# Patient Record
Sex: FEMALE | Race: WHITE | NOT HISPANIC OR LATINO | Employment: OTHER | URBAN - METROPOLITAN AREA
[De-identification: names, ages, dates, MRNs, and addresses within clinical notes are randomized per-mention and may not be internally consistent; named-entity substitution may affect disease eponyms.]

---

## 2019-05-01 ENCOUNTER — HISTORICAL (OUTPATIENT)
Dept: ADMINISTRATIVE | Facility: HOSPITAL | Age: 79
End: 2019-05-01

## 2019-05-06 ENCOUNTER — HISTORICAL (OUTPATIENT)
Dept: ADMINISTRATIVE | Facility: HOSPITAL | Age: 79
End: 2019-05-06

## 2019-06-17 ENCOUNTER — HISTORICAL (OUTPATIENT)
Dept: ADMINISTRATIVE | Facility: HOSPITAL | Age: 79
End: 2019-06-17

## 2022-04-09 ENCOUNTER — HISTORICAL (OUTPATIENT)
Dept: ADMINISTRATIVE | Facility: HOSPITAL | Age: 82
End: 2022-04-09

## 2022-04-26 VITALS
HEIGHT: 62 IN | SYSTOLIC BLOOD PRESSURE: 132 MMHG | DIASTOLIC BLOOD PRESSURE: 68 MMHG | BODY MASS INDEX: 33.68 KG/M2 | WEIGHT: 183 LBS

## 2022-06-27 ENCOUNTER — OFFICE VISIT (OUTPATIENT)
Dept: ORTHOPEDICS | Facility: CLINIC | Age: 82
End: 2022-06-27
Payer: MEDICARE

## 2022-06-27 ENCOUNTER — HOSPITAL ENCOUNTER (OUTPATIENT)
Dept: RADIOLOGY | Facility: CLINIC | Age: 82
Discharge: HOME | End: 2022-06-27
Attending: SPECIALIST
Payer: MEDICARE

## 2022-06-27 VITALS
WEIGHT: 177 LBS | DIASTOLIC BLOOD PRESSURE: 81 MMHG | HEART RATE: 50 BPM | SYSTOLIC BLOOD PRESSURE: 148 MMHG | HEIGHT: 60 IN | BODY MASS INDEX: 34.75 KG/M2

## 2022-06-27 DIAGNOSIS — M25.561 CHRONIC PAIN OF RIGHT KNEE: Primary | ICD-10-CM

## 2022-06-27 DIAGNOSIS — G89.29 CHRONIC PAIN OF RIGHT KNEE: ICD-10-CM

## 2022-06-27 DIAGNOSIS — M17.11 PRIMARY OSTEOARTHRITIS OF RIGHT KNEE: ICD-10-CM

## 2022-06-27 DIAGNOSIS — M25.561 CHRONIC PAIN OF RIGHT KNEE: ICD-10-CM

## 2022-06-27 DIAGNOSIS — Z96.652 HISTORY OF TOTAL KNEE ARTHROPLASTY, LEFT: ICD-10-CM

## 2022-06-27 DIAGNOSIS — G89.29 CHRONIC PAIN OF RIGHT KNEE: Primary | ICD-10-CM

## 2022-06-27 PROCEDURE — 1125F PR PAIN SEVERITY QUANTIFIED, PAIN PRESENT: ICD-10-PCS | Mod: CPTII,,, | Performed by: SPECIALIST

## 2022-06-27 PROCEDURE — 1159F MED LIST DOCD IN RCRD: CPT | Mod: CPTII,,, | Performed by: SPECIALIST

## 2022-06-27 PROCEDURE — 3288F PR FALLS RISK ASSESSMENT DOCUMENTED: ICD-10-PCS | Mod: CPTII,,, | Performed by: SPECIALIST

## 2022-06-27 PROCEDURE — 1125F AMNT PAIN NOTED PAIN PRSNT: CPT | Mod: CPTII,,, | Performed by: SPECIALIST

## 2022-06-27 PROCEDURE — 1101F PR PT FALLS ASSESS DOC 0-1 FALLS W/OUT INJ PAST YR: ICD-10-PCS | Mod: CPTII,,, | Performed by: SPECIALIST

## 2022-06-27 PROCEDURE — 73564 XR KNEE COMP 4 OR MORE VIEWS RIGHT: ICD-10-PCS | Mod: RT,,, | Performed by: SPECIALIST

## 2022-06-27 PROCEDURE — 3079F DIAST BP 80-89 MM HG: CPT | Mod: CPTII,,, | Performed by: SPECIALIST

## 2022-06-27 PROCEDURE — 1160F RVW MEDS BY RX/DR IN RCRD: CPT | Mod: CPTII,,, | Performed by: SPECIALIST

## 2022-06-27 PROCEDURE — 1159F PR MEDICATION LIST DOCUMENTED IN MEDICAL RECORD: ICD-10-PCS | Mod: CPTII,,, | Performed by: SPECIALIST

## 2022-06-27 PROCEDURE — 3288F FALL RISK ASSESSMENT DOCD: CPT | Mod: CPTII,,, | Performed by: SPECIALIST

## 2022-06-27 PROCEDURE — 3077F PR MOST RECENT SYSTOLIC BLOOD PRESSURE >= 140 MM HG: ICD-10-PCS | Mod: CPTII,,, | Performed by: SPECIALIST

## 2022-06-27 PROCEDURE — 99213 OFFICE O/P EST LOW 20 MIN: CPT | Mod: ,,, | Performed by: SPECIALIST

## 2022-06-27 PROCEDURE — 1160F PR REVIEW ALL MEDS BY PRESCRIBER/CLIN PHARMACIST DOCUMENTED: ICD-10-PCS | Mod: CPTII,,, | Performed by: SPECIALIST

## 2022-06-27 PROCEDURE — 99213 PR OFFICE/OUTPT VISIT, EST, LEVL III, 20-29 MIN: ICD-10-PCS | Mod: ,,, | Performed by: SPECIALIST

## 2022-06-27 PROCEDURE — 73564 X-RAY EXAM KNEE 4 OR MORE: CPT | Mod: RT,,, | Performed by: SPECIALIST

## 2022-06-27 PROCEDURE — 1101F PT FALLS ASSESS-DOCD LE1/YR: CPT | Mod: CPTII,,, | Performed by: SPECIALIST

## 2022-06-27 PROCEDURE — 3077F SYST BP >= 140 MM HG: CPT | Mod: CPTII,,, | Performed by: SPECIALIST

## 2022-06-27 PROCEDURE — 3079F PR MOST RECENT DIASTOLIC BLOOD PRESSURE 80-89 MM HG: ICD-10-PCS | Mod: CPTII,,, | Performed by: SPECIALIST

## 2022-06-27 RX ORDER — KETOCONAZOLE 20 MG/ML
1 SHAMPOO, SUSPENSION TOPICAL EVERY OTHER DAY
COMMUNITY
Start: 2022-05-26

## 2022-06-27 RX ORDER — GLIPIZIDE 5 MG/1
5 TABLET, FILM COATED, EXTENDED RELEASE ORAL EVERY MORNING
COMMUNITY
Start: 2022-06-07

## 2022-06-27 RX ORDER — LOSARTAN POTASSIUM 100 MG/1
100 TABLET ORAL EVERY MORNING
COMMUNITY
Start: 2022-06-07

## 2022-06-27 RX ORDER — METOPROLOL TARTRATE 100 MG/1
TABLET ORAL
COMMUNITY
Start: 2022-06-07

## 2022-06-27 RX ORDER — CITALOPRAM 40 MG/1
40 TABLET, FILM COATED ORAL EVERY MORNING
COMMUNITY
Start: 2022-06-07

## 2022-06-27 RX ORDER — TRAZODONE HYDROCHLORIDE 100 MG/1
200 TABLET ORAL NIGHTLY
COMMUNITY
Start: 2022-06-07

## 2022-06-27 RX ORDER — AMLODIPINE BESYLATE 5 MG/1
TABLET ORAL
COMMUNITY
Start: 2021-10-07

## 2022-06-27 RX ORDER — FUROSEMIDE 40 MG/1
40 TABLET ORAL EVERY MORNING
COMMUNITY
Start: 2022-06-07

## 2022-06-27 NOTE — PROGRESS NOTES
Past Medical History:   Diagnosis Date    Carpal tunnel syndrome     Hypertension        Past Surgical History:   Procedure Laterality Date    CARPAL TUNNEL RELEASE      HYSTERECTOMY      JOINT REPLACEMENT      left TKA    rotator cuff Left     TONSILLECTOMY         Current Outpatient Medications   Medication Sig    amLODIPine (NORVASC) 5 MG tablet amlodipine 5 mg tablet    citalopram (CELEXA) 40 MG tablet Take 40 mg by mouth every morning.    furosemide (LASIX) 40 MG tablet Take 40 mg by mouth every morning.    glipiZIDE (GLUCOTROL) 5 MG TR24 Take 5 mg by mouth every morning.    ketoconazole (NIZORAL) 2 % shampoo Apply 1 application topically every other day.    losartan (COZAAR) 100 MG tablet Take 100 mg by mouth every morning.    metoprolol tartrate (LOPRESSOR) 100 MG tablet SMARTSI Tablet(s) By Mouth Morning-Night    traZODone (DESYREL) 100 MG tablet Take 200 mg by mouth nightly.     No current facility-administered medications for this visit.       Review of patient's allergies indicates:   Allergen Reactions    [place] [serum base no.214 (bulk)]     Sulfa (sulfonamide antibiotics)        History reviewed. No pertinent family history.    Social History     Socioeconomic History    Marital status:    Tobacco Use    Smoking status: Former Smoker    Smokeless tobacco: Never Used       Chief Complaint:   Chief Complaint   Patient presents with    Right Knee - Pain    Pain     Here for right knee pain. Pt stated the pain in her right knee has been going on for about 3 years. Pt denies ranjit at this time. Had a right knee injection about 2 months ago.        Consulting Physician: No ref. provider found    History of present illness:    This is a 82 y.o. year old female who complains of right knee pain.  It comes and goes.  She has had a corticosteroid injection in the right knee that gave good pain relief recently by another physician.    Review of Systems:    Constitution:   Denies  chills, fever, and sweats.  HENT:   Denies headaches or blurry vision.  Cardiovascular:  Denies chest pain or irregular heart beat.  Respiratory:   Denies cough or shortness of breath.  Gastrointestinal:  Denies abdominal pain, nausea, or vomiting.  Musculoskeletal:   Denies muscle cramps.  Neurological:   Denies dizziness or focal weakness.  Psychiatric/Behavior: Normal mental status.  Hematology/Lymph:  Denies bleeding problem or easy bruising/bleeding.  Skin:    Denies rash or suspicious lesions.    Examination:    Vital Signs:    Vitals:    06/27/22 0947 06/27/22 0953   BP:  (!) 148/81   Pulse:  (!) 50   Weight:  80.3 kg (177 lb)   Height:  5' (1.524 m)   PainSc:   4        Body mass index is 34.57 kg/m².    Constitution:   Well-developed, well nourished patient in no acute distress.  Neurological:   Alert and oriented x 3 and cooperative to examination.     Psychiatric/Behavior: Normal mental status.  Respiratory:   No shortness of breath.  Eyes:    Extraoccular muscles intact  Skin:    No scars, rash or suspicious lesions.    Physical Exam:  Left knee exam:  Well-healed incision  No swelling, no redness, no increased heat  No effusion  Stable collateral ligaments throughout range of motion symmetrically  Range of motion 0° to 125°  2+ pulses  Neurovascularly intact.    Right knee exam:      General Musculoskeletal Exam   Gait: antalgic       Right Knee Exam     Inspection   Erythema: absent  Effusion: present  Deformity: present  Bruising: absent    Tenderness   The patient is tender to palpation of the medial joint line, MCL, condyle and medial retinaculum.    Crepitus   The patient has crepitus of the medial joint line.    Range of Motion   Extension: abnormal   Flexion: abnormal   0° to 125°    Tests   Meniscus   Serafin:  Medial - positive Lateral - negative  Ligament Examination   MCL - Valgus: normal (0 to 2mm)  LCL - Varus: normal  Patella   Passive Patellar Tilt: neutral    Other   Sensation:  normal    Comments:  Varus deformity    Muscle Strength   Right Lower Extremity   Quadriceps:  5/5   Hamstrin/5     Vascular Exam     Right Pulses  Dorsalis Pedis:      2+  Posterior Tibial:      2+            Imaging: X-rays ordered and images interpreted today personally by me of four views of the right knee.  Patient has advanced degenerative changes with tricompartmental osteophytes and medial and lateral cartilage space narrowing.  Impression:  Moderate osteoarthritis right knee and stable well-aligned left total knee arthroplasty         Assessment: Chronic pain of right knee  -     X-Ray Knee Complete 4 Or More Views Right; Future; Expected date: 2022    Primary osteoarthritis of right knee  -     Ambulatory referral/consult to Physical/Occupational Therapy; Future; Expected date: 2022    History of total knee arthroplasty, left        Plan:  Physical therapy for range of motion and strengthening of the right knee and follow up in 1 year for four view radiographs of the right knee.      DISCLAIMER: This note may have been dictated using voice recognition software and may contain grammatical errors.     NOTE: Consult report sent to referring provider via [employer].

## 2023-02-22 ENCOUNTER — HOSPITAL ENCOUNTER (EMERGENCY)
Facility: HOSPITAL | Age: 83
Discharge: HOME | End: 2023-02-22
Attending: STUDENT IN AN ORGANIZED HEALTH CARE EDUCATION/TRAINING PROGRAM
Payer: MEDICARE

## 2023-02-22 VITALS
DIASTOLIC BLOOD PRESSURE: 85 MMHG | WEIGHT: 170 LBS | BODY MASS INDEX: 33.38 KG/M2 | SYSTOLIC BLOOD PRESSURE: 152 MMHG | TEMPERATURE: 97 F | HEART RATE: 50 BPM | OXYGEN SATURATION: 95 % | HEIGHT: 60 IN | RESPIRATION RATE: 16 BRPM

## 2023-02-22 DIAGNOSIS — S80.02XA CONTUSION OF LEFT KNEE, INITIAL ENCOUNTER: ICD-10-CM

## 2023-02-22 DIAGNOSIS — S09.90XA CLOSED HEAD INJURY, INITIAL ENCOUNTER: Primary | ICD-10-CM
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
## 2023-02-22 DIAGNOSIS — S80.01XA CONTUSION OF RIGHT KNEE, INITIAL ENCOUNTER: ICD-10-CM

## 2023-02-22 PROCEDURE — 99284 EMERGENCY DEPT VISIT MOD MDM: CPT | Mod: 25

## 2023-02-22 NOTE — FIRST PROVIDER EVALUATION
Medical screening examination initiated.  I have conducted a focused provider triage encounter, findings are as follows:    Brief history of present illness:  82-year-old female presents to ED for evaluation after trip and fall just prior to arrival.  Patient states that she was at the pharmacy when she tripped and fell hitting her head against the glass door.  States that she fell to her knees causing bilateral knee pain.  Denies any loss of consciousness.  Denies any nausea or vomiting.    Vitals:    02/22/23 1502   BP: 127/67   Pulse: 60   Resp: 18   Temp: 96.8 °F (36 °C)   TempSrc: Oral   SpO2: 95%   Weight: 77.1 kg (170 lb)   Height: 5' (1.524 m)       Pertinent physical exam:  Patient awake alert and oriented.  Hematoma noted to right forehead. Sitting in chair    Brief workup plan:  CT head/neck, x-ray bilateral knee    Preliminary workup initiated; this workup will be continued and followed by the physician or advanced practice provider that is assigned to the patient when roomed.

## 2023-02-23 NOTE — ED PROVIDER NOTES
Encounter Date: 2/22/2023       History     Chief Complaint   Patient presents with    Fall     EMS reports slip and fall from standing striking head, denies LOC, denies blood thinners, GCS 15.      See MDM    The history is provided by the patient. No  was used.   Review of patient's allergies indicates:   Allergen Reactions    [place] [serum base no.214 (bulk)]     Sulfa (sulfonamide antibiotics)      Past Medical History:   Diagnosis Date    Carpal tunnel syndrome     Hypertension      Past Surgical History:   Procedure Laterality Date    CARPAL TUNNEL RELEASE      HYSTERECTOMY      JOINT REPLACEMENT      left TKA    rotator cuff Left     TONSILLECTOMY       History reviewed. No pertinent family history.  Social History     Tobacco Use    Smoking status: Former    Smokeless tobacco: Never   Substance Use Topics    Drug use: Never     Review of Systems   Constitutional:  Negative for fever.   Respiratory:  Negative for cough and shortness of breath.    Cardiovascular:  Negative for chest pain.   Gastrointestinal:  Negative for abdominal pain.   Genitourinary:  Negative for difficulty urinating and dysuria.   Musculoskeletal:  Negative for gait problem.   Skin:  Negative for color change.   Neurological:  Positive for headaches. Negative for dizziness and speech difficulty.   Psychiatric/Behavioral:  Negative for hallucinations and suicidal ideas.    All other systems reviewed and are negative.    Physical Exam     Initial Vitals [02/22/23 1502]   BP Pulse Resp Temp SpO2   127/67 60 18 96.8 °F (36 °C) 95 %      MAP       --         Physical Exam    Nursing note and vitals reviewed.  Constitutional: She appears well-developed and well-nourished.   HENT:   Head: Normocephalic.   Eyes: EOM are normal.   Neck:   Normal range of motion.  Cardiovascular:  Normal rate, regular rhythm, normal heart sounds and intact distal pulses.           Pulmonary/Chest: Breath sounds normal. No respiratory distress.    Abdominal: Abdomen is soft. Bowel sounds are normal. There is no abdominal tenderness.   Musculoskeletal:         General: Normal range of motion.      Cervical back: Normal range of motion.      Comments: Tenderness bilateral anterior knee     Neurological: She is alert and oriented to person, place, and time. She has normal strength.   Skin: Skin is warm and dry.   Psychiatric: She has a normal mood and affect. Her behavior is normal. Judgment and thought content normal.       ED Course   Procedures  Labs Reviewed - No data to display       Imaging Results              CT Head Without Contrast (Final result)  Result time 02/22/23 17:23:11      Final result by Alena Hussein MD (02/22/23 17:23:11)                   Impression:      1. No acute intracranial abnormality.  2. Chronic microvascular ischemic changes.      Electronically signed by: Alena Hussein  Date:    02/22/2023  Time:    17:23               Narrative:    EXAMINATION:  CT HEAD WITHOUT CONTRAST    CLINICAL HISTORY:  Head trauma, minor (Age >= 65y);    TECHNIQUE:  Axial scans were obtained from skull base to the vertex.    Coronal and sagittal reconstructions obtained from the axial data.    Automatic exposure control was utilized to limit radiation dose.    Contrast: None    Radiation Dose:    Total DLP: 1027 mGy*cm    COMPARISON:  CT head dated 07/07/2019    FINDINGS:  There is no acute intracranial hemorrhage or edema. The gray-white matter differentiation is preserved.  Patchy hypodensities in the subcortical and periventricular white matter likely represent chronic microvascular ischemic changes.    There is no mass effect or midline shift.  There is diffuse parenchymal volume loss.  The basal cisterns are patent. There is no abnormal extra-axial fluid collection.  Carotid and vertebral artery calcifications are noted    The calvarium and skull base are intact.  There is mild scattered paranasal sinus mucosal thickening.                                        CT Cervical Spine Without Contrast (Final result)  Result time 02/22/23 17:22:24      Final result by Cristopher Sevilla MD (02/22/23 17:22:24)                   Impression:      Loss of the normal lordotic curve of the cervical spine most likely related to spasm but otherwise unremarkable with no evidence of acute fracture or dislocation seen    Incidental note is made of some degenerative changes throughout the cervical spine      Electronically signed by: Cristopher Sevilla  Date:    02/22/2023  Time:    17:22               Narrative:    EXAMINATION:  CT CERVICAL SPINE WITHOUT CONTRAST    CLINICAL HISTORY:  Neck trauma (Age >= 65y);    TECHNIQUE:  Low dose axial images, sagittal and coronal reformations were performed though the cervical spine.  Contrast was not administered. Automatic exposure control is utilized to reduce patient radiation exposure.    COMPARISON:  None    06/27/2019    FINDINGS:  The vertebral body heights are well maintained. There is some loss of the normal lordotic curve cervical spine most likely related to spasm. No fracture is seen. No dislocation is seen. The odontoid and lateral masses appear grossly unremarkable.  There are some degenerative changes seen throughout the cervical spine which appear to be chronic.                                       X-Ray Knee Complete 4 Or More Views Right (Final result)  Result time 02/22/23 16:04:53      Final result by Alena Hussein MD (02/22/23 16:04:53)                   Impression:      1. No acute abnormality identified.  2. Mild degenerative changes of the knee.      Electronically signed by: Alena Hussein  Date:    02/22/2023  Time:    16:04               Narrative:    EXAMINATION:  XR KNEE COMP 4 OR MORE VIEWS RIGHT    CLINICAL HISTORY:  Unspecified fall, initial encounter    COMPARISON:  None.    FINDINGS:  There is no acute fracture or malalignment.  There are tricompartmental osteophytes.  There is no  knee joint effusion.  The soft tissues are unremarkable.                                       X-Ray Knee Complete 4 or More Views Left (Final result)  Result time 02/22/23 16:03:53      Final result by Alena Hussein MD (02/22/23 16:03:53)                   Impression:      No acute bony abnormality.      Electronically signed by: Aelna Hussein  Date:    02/22/2023  Time:    16:03               Narrative:    EXAMINATION:  XR KNEE COMP 4 OR MORE VIEWS LEFT    CLINICAL HISTORY:  Unspecified fall, initial encounter    COMPARISON:  X-rays dated 06/27/2022    FINDINGS:  There is no acute fracture or malalignment.  There is stable alignment following total knee arthroplasty.  There is no knee joint effusion.  The soft tissues are unremarkable.                                       Medications - No data to display  Medical Decision Making:   Initial Assessment:   Historian:  Patient.  Patient is a 82-year-old female  that presents with fall that has been present today. Associated symptoms head pain, bilateral knee pain, neck pain. Surrounding information is fall from standing. Exacerbated by nothing. Relieved by nothing. Patient treatment prior to arrival nothing. Risk factors include age. Other history pertaining to this complaint nothing.   Assessment:  See physical exam.    Differential Diagnosis:   Closed head injury, intracranial hemorrhage, skull fracture, concussion, knee contusion, knee fracture  ED Management:  History was obtained.  Physical was performed.  I reviewed patient's x-rays and CT scan they were negative for acute findings.  Patient is at baseline mental status and neuro status.  No medical or surgical consult were indicated.  No social determinants to affect her healthcare were noted.                        Clinical Impression:   Final diagnoses:  [W19.XXXA] Fall  [S09.90XA] Closed head injury, initial encounter (Primary)  [S80.02XA] Contusion of left knee, initial encounter  [S80.01XA]  Contusion of right knee, initial encounter        ED Disposition Condition    Discharge Stable          ED Prescriptions    None       Follow-up Information       Follow up With Specialties Details Why Contact Info    Your Primary Care Provider  Call in 3 days ed follow up              PILI Burgos  02/22/23 2008

## 2023-11-10 ENCOUNTER — HOSPITAL ENCOUNTER (INPATIENT)
Facility: HOSPITAL | Age: 83
LOS: 11 days | Discharge: HOME HEALTH | DRG: 177 | End: 2023-11-21
Attending: STUDENT IN AN ORGANIZED HEALTH CARE EDUCATION/TRAINING PROGRAM | Admitting: INTERNAL MEDICINE
Payer: MEDICARE

## 2023-11-10 DIAGNOSIS — R09.02 HYPOXIA: Primary | ICD-10-CM

## 2023-11-10 DIAGNOSIS — E87.6 HYPOKALEMIA: ICD-10-CM

## 2023-11-10 DIAGNOSIS — J18.9 PNEUMONIA OF BOTH LUNGS DUE TO INFECTIOUS ORGANISM, UNSPECIFIED PART OF LUNG: ICD-10-CM

## 2023-11-10 DIAGNOSIS — R78.81 BACTEREMIA: ICD-10-CM

## 2023-11-10 LAB
ALBUMIN SERPL-MCNC: 3.7 G/DL (ref 3.4–4.8)
ALBUMIN/GLOB SERPL: 1.5 RATIO (ref 1.1–2)
ALP SERPL-CCNC: 75 UNIT/L (ref 40–150)
ALT SERPL-CCNC: 16 UNIT/L (ref 0–55)
APPEARANCE UR: CLEAR
AST SERPL-CCNC: 19 UNIT/L (ref 5–34)
BACTERIA #/AREA URNS AUTO: ABNORMAL /HPF
BASOPHILS # BLD AUTO: 0.03 X10(3)/MCL
BASOPHILS NFR BLD AUTO: 0.4 %
BILIRUB SERPL-MCNC: 0.8 MG/DL
BILIRUB UR QL STRIP.AUTO: NEGATIVE
BNP BLD-MCNC: 53.4 PG/ML
BUN SERPL-MCNC: 10.2 MG/DL (ref 9.8–20.1)
CALCIUM SERPL-MCNC: 8.7 MG/DL (ref 8.4–10.2)
CHLORIDE SERPL-SCNC: 101 MMOL/L (ref 98–107)
CO2 SERPL-SCNC: 26 MMOL/L (ref 23–31)
COLOR UR AUTO: ABNORMAL
CREAT SERPL-MCNC: 0.7 MG/DL (ref 0.55–1.02)
EOSINOPHIL # BLD AUTO: 0.01 X10(3)/MCL (ref 0–0.9)
EOSINOPHIL NFR BLD AUTO: 0.1 %
ERYTHROCYTE [DISTWIDTH] IN BLOOD BY AUTOMATED COUNT: 13.3 % (ref 11.5–17)
FLUAV AG UPPER RESP QL IA.RAPID: NOT DETECTED
FLUBV AG UPPER RESP QL IA.RAPID: NOT DETECTED
GFR SERPLBLD CREATININE-BSD FMLA CKD-EPI: >60 MLS/MIN/1.73/M2
GLOBULIN SER-MCNC: 2.4 GM/DL (ref 2.4–3.5)
GLUCOSE SERPL-MCNC: 128 MG/DL (ref 82–115)
GLUCOSE UR QL STRIP.AUTO: NORMAL
HCT VFR BLD AUTO: 42.3 % (ref 37–47)
HGB BLD-MCNC: 14 G/DL (ref 12–16)
IMM GRANULOCYTES # BLD AUTO: 0.03 X10(3)/MCL (ref 0–0.04)
IMM GRANULOCYTES NFR BLD AUTO: 0.4 %
KETONES UR QL STRIP.AUTO: NEGATIVE
LACTATE SERPL-SCNC: 0.7 MMOL/L (ref 0.5–2.2)
LEUKOCYTE ESTERASE UR QL STRIP.AUTO: 75
LYMPHOCYTES # BLD AUTO: 0.8 X10(3)/MCL (ref 0.6–4.6)
LYMPHOCYTES NFR BLD AUTO: 11.9 %
MAGNESIUM SERPL-MCNC: 1.6 MG/DL (ref 1.6–2.6)
MCH RBC QN AUTO: 28.2 PG (ref 27–31)
MCHC RBC AUTO-ENTMCNC: 33.1 G/DL (ref 33–36)
MCV RBC AUTO: 85.3 FL (ref 80–94)
MONOCYTES # BLD AUTO: 0.54 X10(3)/MCL (ref 0.1–1.3)
MONOCYTES NFR BLD AUTO: 8 %
MUCOUS THREADS URNS QL MICRO: ABNORMAL /LPF
NEUTROPHILS # BLD AUTO: 5.32 X10(3)/MCL (ref 2.1–9.2)
NEUTROPHILS NFR BLD AUTO: 79.2 %
NITRITE UR QL STRIP.AUTO: NEGATIVE
NRBC BLD AUTO-RTO: 0 %
PH UR STRIP.AUTO: 5.5 [PH]
PLATELET # BLD AUTO: 210 X10(3)/MCL (ref 130–400)
PMV BLD AUTO: 8.9 FL (ref 7.4–10.4)
POTASSIUM SERPL-SCNC: 4.3 MMOL/L (ref 3.5–5.1)
PROT SERPL-MCNC: 6.1 GM/DL (ref 5.8–7.6)
PROT UR QL STRIP.AUTO: NEGATIVE
RBC # BLD AUTO: 4.96 X10(6)/MCL (ref 4.2–5.4)
RBC #/AREA URNS AUTO: ABNORMAL /HPF
RBC UR QL AUTO: NEGATIVE
SARS-COV-2 RNA RESP QL NAA+PROBE: NOT DETECTED
SODIUM SERPL-SCNC: 136 MMOL/L (ref 136–145)
SP GR UR STRIP.AUTO: 1 (ref 1–1.03)
SQUAMOUS #/AREA URNS LPF: ABNORMAL /HPF
TROPONIN I SERPL-MCNC: 0.01 NG/ML (ref 0–0.04)
UROBILINOGEN UR STRIP-ACNC: NORMAL
WBC # SPEC AUTO: 6.73 X10(3)/MCL (ref 4.5–11.5)
WBC #/AREA URNS AUTO: ABNORMAL /HPF

## 2023-11-10 PROCEDURE — 0240U COVID/FLU A&B PCR: CPT | Performed by: STUDENT IN AN ORGANIZED HEALTH CARE EDUCATION/TRAINING PROGRAM

## 2023-11-10 PROCEDURE — 63600175 PHARM REV CODE 636 W HCPCS: Performed by: STUDENT IN AN ORGANIZED HEALTH CARE EDUCATION/TRAINING PROGRAM

## 2023-11-10 PROCEDURE — 94640 AIRWAY INHALATION TREATMENT: CPT

## 2023-11-10 PROCEDURE — 93010 ELECTROCARDIOGRAM REPORT: CPT | Mod: 76,,, | Performed by: INTERNAL MEDICINE

## 2023-11-10 PROCEDURE — 93005 ELECTROCARDIOGRAM TRACING: CPT

## 2023-11-10 PROCEDURE — 81001 URINALYSIS AUTO W/SCOPE: CPT | Performed by: STUDENT IN AN ORGANIZED HEALTH CARE EDUCATION/TRAINING PROGRAM

## 2023-11-10 PROCEDURE — 99900031 HC PATIENT EDUCATION (STAT)

## 2023-11-10 PROCEDURE — 25000003 PHARM REV CODE 250: Performed by: STUDENT IN AN ORGANIZED HEALTH CARE EDUCATION/TRAINING PROGRAM

## 2023-11-10 PROCEDURE — 80053 COMPREHEN METABOLIC PANEL: CPT | Performed by: STUDENT IN AN ORGANIZED HEALTH CARE EDUCATION/TRAINING PROGRAM

## 2023-11-10 PROCEDURE — 83605 ASSAY OF LACTIC ACID: CPT | Performed by: STUDENT IN AN ORGANIZED HEALTH CARE EDUCATION/TRAINING PROGRAM

## 2023-11-10 PROCEDURE — 96365 THER/PROPH/DIAG IV INF INIT: CPT

## 2023-11-10 PROCEDURE — 11000001 HC ACUTE MED/SURG PRIVATE ROOM

## 2023-11-10 PROCEDURE — 84484 ASSAY OF TROPONIN QUANT: CPT | Performed by: STUDENT IN AN ORGANIZED HEALTH CARE EDUCATION/TRAINING PROGRAM

## 2023-11-10 PROCEDURE — 83880 ASSAY OF NATRIURETIC PEPTIDE: CPT | Performed by: STUDENT IN AN ORGANIZED HEALTH CARE EDUCATION/TRAINING PROGRAM

## 2023-11-10 PROCEDURE — 85025 COMPLETE CBC W/AUTO DIFF WBC: CPT | Performed by: STUDENT IN AN ORGANIZED HEALTH CARE EDUCATION/TRAINING PROGRAM

## 2023-11-10 PROCEDURE — 87077 CULTURE AEROBIC IDENTIFY: CPT | Performed by: STUDENT IN AN ORGANIZED HEALTH CARE EDUCATION/TRAINING PROGRAM

## 2023-11-10 PROCEDURE — 87154 CUL TYP ID BLD PTHGN 6+ TRGT: CPT | Performed by: STUDENT IN AN ORGANIZED HEALTH CARE EDUCATION/TRAINING PROGRAM

## 2023-11-10 PROCEDURE — 63600175 PHARM REV CODE 636 W HCPCS: Performed by: INTERNAL MEDICINE

## 2023-11-10 PROCEDURE — 25500020 PHARM REV CODE 255: Performed by: STUDENT IN AN ORGANIZED HEALTH CARE EDUCATION/TRAINING PROGRAM

## 2023-11-10 PROCEDURE — 93010 EKG 12-LEAD: ICD-10-PCS | Mod: 76,,, | Performed by: INTERNAL MEDICINE

## 2023-11-10 PROCEDURE — 99285 EMERGENCY DEPT VISIT HI MDM: CPT | Mod: 25

## 2023-11-10 PROCEDURE — 25000242 PHARM REV CODE 250 ALT 637 W/ HCPCS

## 2023-11-10 PROCEDURE — 87040 BLOOD CULTURE FOR BACTERIA: CPT | Performed by: STUDENT IN AN ORGANIZED HEALTH CARE EDUCATION/TRAINING PROGRAM

## 2023-11-10 PROCEDURE — 96368 THER/DIAG CONCURRENT INF: CPT

## 2023-11-10 PROCEDURE — 93010 ELECTROCARDIOGRAM REPORT: CPT | Mod: ,,, | Performed by: INTERNAL MEDICINE

## 2023-11-10 PROCEDURE — 83735 ASSAY OF MAGNESIUM: CPT | Performed by: STUDENT IN AN ORGANIZED HEALTH CARE EDUCATION/TRAINING PROGRAM

## 2023-11-10 PROCEDURE — 25000242 PHARM REV CODE 250 ALT 637 W/ HCPCS: Performed by: STUDENT IN AN ORGANIZED HEALTH CARE EDUCATION/TRAINING PROGRAM

## 2023-11-10 RX ORDER — INSULIN ASPART 100 [IU]/ML
0-10 INJECTION, SOLUTION INTRAVENOUS; SUBCUTANEOUS
Status: DISCONTINUED | OUTPATIENT
Start: 2023-11-10 | End: 2023-11-21 | Stop reason: HOSPADM

## 2023-11-10 RX ORDER — FAMOTIDINE 40 MG/1
40 TABLET, FILM COATED ORAL DAILY
COMMUNITY

## 2023-11-10 RX ORDER — ALBUTEROL SULFATE 0.83 MG/ML
SOLUTION RESPIRATORY (INHALATION)
Status: COMPLETED
Start: 2023-11-10 | End: 2023-11-10

## 2023-11-10 RX ORDER — SODIUM CHLORIDE 0.9 % (FLUSH) 0.9 %
10 SYRINGE (ML) INJECTION
Status: DISCONTINUED | OUTPATIENT
Start: 2023-11-10 | End: 2023-11-21 | Stop reason: HOSPADM

## 2023-11-10 RX ORDER — IBUPROFEN 200 MG
16 TABLET ORAL
Status: DISCONTINUED | OUTPATIENT
Start: 2023-11-10 | End: 2023-11-15

## 2023-11-10 RX ORDER — ALBUTEROL SULFATE 0.83 MG/ML
10 SOLUTION RESPIRATORY (INHALATION)
Status: COMPLETED | OUTPATIENT
Start: 2023-11-10 | End: 2023-11-10

## 2023-11-10 RX ORDER — PRAVASTATIN SODIUM 40 MG/1
40 TABLET ORAL DAILY
COMMUNITY

## 2023-11-10 RX ORDER — ENOXAPARIN SODIUM 100 MG/ML
40 INJECTION SUBCUTANEOUS EVERY 24 HOURS
Status: DISCONTINUED | OUTPATIENT
Start: 2023-11-10 | End: 2023-11-21 | Stop reason: HOSPADM

## 2023-11-10 RX ORDER — TALC
6 POWDER (GRAM) TOPICAL NIGHTLY PRN
Status: DISCONTINUED | OUTPATIENT
Start: 2023-11-10 | End: 2023-11-21 | Stop reason: HOSPADM

## 2023-11-10 RX ORDER — ACETAMINOPHEN 325 MG/1
650 TABLET ORAL EVERY 8 HOURS PRN
Status: DISCONTINUED | OUTPATIENT
Start: 2023-11-10 | End: 2023-11-21 | Stop reason: HOSPADM

## 2023-11-10 RX ORDER — LEVOTHYROXINE SODIUM 50 UG/1
50 TABLET ORAL
COMMUNITY

## 2023-11-10 RX ORDER — LEVOFLOXACIN 5 MG/ML
750 INJECTION, SOLUTION INTRAVENOUS
Status: DISCONTINUED | OUTPATIENT
Start: 2023-11-10 | End: 2023-11-13

## 2023-11-10 RX ORDER — IPRATROPIUM BROMIDE 0.5 MG/2.5ML
0.5 SOLUTION RESPIRATORY (INHALATION)
Status: COMPLETED | OUTPATIENT
Start: 2023-11-10 | End: 2023-11-10

## 2023-11-10 RX ORDER — ASPIRIN 81 MG/1
81 TABLET ORAL DAILY
COMMUNITY

## 2023-11-10 RX ORDER — ACETAMINOPHEN 500 MG
1000 TABLET ORAL
Status: COMPLETED | OUTPATIENT
Start: 2023-11-10 | End: 2023-11-10

## 2023-11-10 RX ORDER — CLONAZEPAM 1 MG/1
1 TABLET ORAL 2 TIMES DAILY PRN
COMMUNITY

## 2023-11-10 RX ORDER — BENZONATATE 100 MG/1
100 CAPSULE ORAL 3 TIMES DAILY PRN
Status: DISCONTINUED | OUTPATIENT
Start: 2023-11-10 | End: 2023-11-17

## 2023-11-10 RX ORDER — GLUCAGON 1 MG
1 KIT INJECTION
Status: DISCONTINUED | OUTPATIENT
Start: 2023-11-10 | End: 2023-11-15

## 2023-11-10 RX ORDER — IBUPROFEN 200 MG
24 TABLET ORAL
Status: DISCONTINUED | OUTPATIENT
Start: 2023-11-10 | End: 2023-11-15

## 2023-11-10 RX ADMIN — PIPERACILLIN AND TAZOBACTAM 4.5 G: 4; .5 INJECTION, POWDER, LYOPHILIZED, FOR SOLUTION INTRAVENOUS; PARENTERAL at 06:11

## 2023-11-10 RX ADMIN — ACETAMINOPHEN 1000 MG: 500 TABLET ORAL at 09:11

## 2023-11-10 RX ADMIN — ALBUTEROL SULFATE 2.5 MG: 2.5 SOLUTION RESPIRATORY (INHALATION) at 08:11

## 2023-11-10 RX ADMIN — IPRATROPIUM BROMIDE 0.5 MG: 0.5 SOLUTION RESPIRATORY (INHALATION) at 07:11

## 2023-11-10 RX ADMIN — IOPAMIDOL 100 ML: 755 INJECTION, SOLUTION INTRAVENOUS at 07:11

## 2023-11-10 RX ADMIN — ALBUTEROL SULFATE 10 MG: 2.5 SOLUTION RESPIRATORY (INHALATION) at 07:11

## 2023-11-10 RX ADMIN — LEVOFLOXACIN 750 MG: 750 INJECTION, SOLUTION INTRAVENOUS at 10:11

## 2023-11-10 RX ADMIN — ENOXAPARIN SODIUM 40 MG: 40 INJECTION SUBCUTANEOUS at 11:11

## 2023-11-10 RX ADMIN — VANCOMYCIN HYDROCHLORIDE 2000 MG: 500 INJECTION, POWDER, LYOPHILIZED, FOR SOLUTION INTRAVENOUS at 07:11

## 2023-11-10 RX ADMIN — SODIUM CHLORIDE, POTASSIUM CHLORIDE, SODIUM LACTATE AND CALCIUM CHLORIDE 1365 ML: 600; 310; 30; 20 INJECTION, SOLUTION INTRAVENOUS at 06:11

## 2023-11-11 LAB
ACINETOBACTER CALCOACETICUS-BAUMANNII COMPLEX (OHS): NOT DETECTED
ALBUMIN SERPL-MCNC: 3.2 G/DL (ref 3.4–4.8)
ALBUMIN/GLOB SERPL: 1.5 RATIO (ref 1.1–2)
ALP SERPL-CCNC: 65 UNIT/L (ref 40–150)
ALT SERPL-CCNC: 14 UNIT/L (ref 0–55)
AST SERPL-CCNC: 16 UNIT/L (ref 5–34)
BACTEROIDES FRAGILIS (OHS): NOT DETECTED
BASOPHILS # BLD AUTO: 0.01 X10(3)/MCL
BASOPHILS NFR BLD AUTO: 0.2 %
BILIRUB SERPL-MCNC: 0.5 MG/DL
BUN SERPL-MCNC: 9.4 MG/DL (ref 9.8–20.1)
C AURIS DNA BLD POS QL NAA+NON-PROBE: NOT DETECTED
C GATTII+NEOFOR DNA CSF QL NAA+NON-PROBE: NOT DETECTED
CALCIUM SERPL-MCNC: 8 MG/DL (ref 8.4–10.2)
CANDIDA ALBICANS (OHS): NOT DETECTED
CANDIDA GLABRATA (OHS): NOT DETECTED
CANDIDA KRUSEI (OHS): NOT DETECTED
CANDIDA PARAPSILOSIS (OHS): NOT DETECTED
CANDIDA TROPICALIS (OHS): NOT DETECTED
CHLORIDE SERPL-SCNC: 99 MMOL/L (ref 98–107)
CO2 SERPL-SCNC: 29 MMOL/L (ref 23–31)
CREAT SERPL-MCNC: 0.63 MG/DL (ref 0.55–1.02)
CTX-M (OHS): ABNORMAL
ENTEROBACTER CLOACAE COMPLEX (OHS): NOT DETECTED
ENTEROBACTERALES (OHS): NOT DETECTED
ENTEROCOCCUS FAECALIS (OHS): NOT DETECTED
ENTEROCOCCUS FAECIUM (OHS): NOT DETECTED
EOSINOPHIL # BLD AUTO: 0.01 X10(3)/MCL (ref 0–0.9)
EOSINOPHIL NFR BLD AUTO: 0.2 %
ERYTHROCYTE [DISTWIDTH] IN BLOOD BY AUTOMATED COUNT: 13.3 % (ref 11.5–17)
ESCHERICHIA COLI (OHS): NOT DETECTED
GFR SERPLBLD CREATININE-BSD FMLA CKD-EPI: >60 MLS/MIN/1.73/M2
GLOBULIN SER-MCNC: 2.1 GM/DL (ref 2.4–3.5)
GLUCOSE SERPL-MCNC: 87 MG/DL (ref 82–115)
GP B STREP DNA CSF QL NAA+NON-PROBE: NOT DETECTED
HAEM INFLU DNA CSF QL NAA+NON-PROBE: NOT DETECTED
HCT VFR BLD AUTO: 38.7 % (ref 37–47)
HGB BLD-MCNC: 12.6 G/DL (ref 12–16)
IMM GRANULOCYTES # BLD AUTO: 0.01 X10(3)/MCL (ref 0–0.04)
IMM GRANULOCYTES NFR BLD AUTO: 0.2 %
IMP (OHS): ABNORMAL
KLEBSIELLA AEROGENES (OHS): NOT DETECTED
KLEBSIELLA OXYTOCA (OHS): NOT DETECTED
KLEBSIELLA PNEUMONIAE GROUP (OHS): NOT DETECTED
KPC (OHS): ABNORMAL
L MONOCYTOG DNA CSF QL NAA+NON-PROBE: NOT DETECTED
LYMPHOCYTES # BLD AUTO: 0.79 X10(3)/MCL (ref 0.6–4.6)
LYMPHOCYTES NFR BLD AUTO: 17 %
MCH RBC QN AUTO: 28.1 PG (ref 27–31)
MCHC RBC AUTO-ENTMCNC: 32.6 G/DL (ref 33–36)
MCR-1 (OHS): ABNORMAL
MCV RBC AUTO: 86.2 FL (ref 80–94)
MECA/C (OHS): ABNORMAL
MECA/C AND MREJ (MRSA)(OHS): ABNORMAL
MONOCYTES # BLD AUTO: 0.61 X10(3)/MCL (ref 0.1–1.3)
MONOCYTES NFR BLD AUTO: 13.1 %
N MEN DNA CSF QL NAA+NON-PROBE: NOT DETECTED
NDM (OHS): ABNORMAL
NEUTROPHILS # BLD AUTO: 3.23 X10(3)/MCL (ref 2.1–9.2)
NEUTROPHILS NFR BLD AUTO: 69.3 %
NRBC BLD AUTO-RTO: 0 %
OXA-48-LIKE (OHS): ABNORMAL
PLATELET # BLD AUTO: 172 X10(3)/MCL (ref 130–400)
PMV BLD AUTO: 9 FL (ref 7.4–10.4)
POCT GLUCOSE: 101 MG/DL (ref 70–110)
POCT GLUCOSE: 117 MG/DL (ref 70–110)
POCT GLUCOSE: 76 MG/DL (ref 70–110)
POTASSIUM SERPL-SCNC: 3.3 MMOL/L (ref 3.5–5.1)
PROT SERPL-MCNC: 5.3 GM/DL (ref 5.8–7.6)
PROTEUS SPP. (OHS): NOT DETECTED
PSEUDOMONAS AERUGINOSA (OHS): NOT DETECTED
RBC # BLD AUTO: 4.49 X10(6)/MCL (ref 4.2–5.4)
S ENT+BONG DNA STL QL NAA+NON-PROBE: NOT DETECTED
S PNEUM DNA CSF QL NAA+NON-PROBE: NOT DETECTED
SERRATIA MARCESCENS (OHS): NOT DETECTED
SODIUM SERPL-SCNC: 135 MMOL/L (ref 136–145)
STAPHYLOCOCCUS AUREUS (OHS): NOT DETECTED
STAPHYLOCOCCUS EPIDERMIDIS (OHS): NOT DETECTED
STAPHYLOCOCCUS LUGDUNENSIS (OHS): NOT DETECTED
STAPHYLOCOCCUS SPP. (OHS): DETECTED
STENOTROPHOMONAS MALTOPHILIA (OHS): NOT DETECTED
STREPTOCOCCUS PYOGENES (GROUP A)(OHS): NOT DETECTED
STREPTOCOCCUS SPP. (OHS): NOT DETECTED
VANA/B (OHS): ABNORMAL
VIM (OHS): ABNORMAL
WBC # SPEC AUTO: 4.66 X10(3)/MCL (ref 4.5–11.5)

## 2023-11-11 PROCEDURE — 25000003 PHARM REV CODE 250: Performed by: INTERNAL MEDICINE

## 2023-11-11 PROCEDURE — 63600175 PHARM REV CODE 636 W HCPCS: Performed by: INTERNAL MEDICINE

## 2023-11-11 PROCEDURE — 80053 COMPREHEN METABOLIC PANEL: CPT | Performed by: INTERNAL MEDICINE

## 2023-11-11 PROCEDURE — 85025 COMPLETE CBC W/AUTO DIFF WBC: CPT | Performed by: INTERNAL MEDICINE

## 2023-11-11 PROCEDURE — 11000001 HC ACUTE MED/SURG PRIVATE ROOM

## 2023-11-11 PROCEDURE — 94761 N-INVAS EAR/PLS OXIMETRY MLT: CPT

## 2023-11-11 PROCEDURE — 21400001 HC TELEMETRY ROOM

## 2023-11-11 PROCEDURE — 27000221 HC OXYGEN, UP TO 24 HOURS

## 2023-11-11 RX ORDER — CLONAZEPAM 1 MG/1
1 TABLET ORAL 2 TIMES DAILY PRN
Status: DISCONTINUED | OUTPATIENT
Start: 2023-11-11 | End: 2023-11-21 | Stop reason: HOSPADM

## 2023-11-11 RX ORDER — AMLODIPINE BESYLATE 5 MG/1
5 TABLET ORAL DAILY
Status: DISCONTINUED | OUTPATIENT
Start: 2023-11-11 | End: 2023-11-13

## 2023-11-11 RX ORDER — PRAVASTATIN SODIUM 40 MG/1
40 TABLET ORAL DAILY
Status: DISCONTINUED | OUTPATIENT
Start: 2023-11-11 | End: 2023-11-13

## 2023-11-11 RX ORDER — ASPIRIN 81 MG/1
81 TABLET ORAL DAILY
Status: DISCONTINUED | OUTPATIENT
Start: 2023-11-11 | End: 2023-11-21 | Stop reason: HOSPADM

## 2023-11-11 RX ORDER — GLUCAGON 1 MG
1 KIT INJECTION
Status: DISCONTINUED | OUTPATIENT
Start: 2023-11-11 | End: 2023-11-21 | Stop reason: HOSPADM

## 2023-11-11 RX ORDER — LOSARTAN POTASSIUM 50 MG/1
100 TABLET ORAL EVERY MORNING
Status: DISCONTINUED | OUTPATIENT
Start: 2023-11-11 | End: 2023-11-21 | Stop reason: HOSPADM

## 2023-11-11 RX ORDER — IBUPROFEN 200 MG
16 TABLET ORAL
Status: DISCONTINUED | OUTPATIENT
Start: 2023-11-11 | End: 2023-11-21 | Stop reason: HOSPADM

## 2023-11-11 RX ORDER — IBUPROFEN 200 MG
24 TABLET ORAL
Status: DISCONTINUED | OUTPATIENT
Start: 2023-11-11 | End: 2023-11-21 | Stop reason: HOSPADM

## 2023-11-11 RX ORDER — VANCOMYCIN HCL IN 5 % DEXTROSE 1G/250ML
1000 PLASTIC BAG, INJECTION (ML) INTRAVENOUS
Status: DISCONTINUED | OUTPATIENT
Start: 2023-11-12 | End: 2023-11-14

## 2023-11-11 RX ORDER — TRAZODONE HYDROCHLORIDE 100 MG/1
200 TABLET ORAL NIGHTLY
Status: DISCONTINUED | OUTPATIENT
Start: 2023-11-11 | End: 2023-11-21 | Stop reason: HOSPADM

## 2023-11-11 RX ORDER — FUROSEMIDE 40 MG/1
40 TABLET ORAL EVERY MORNING
Status: DISCONTINUED | OUTPATIENT
Start: 2023-11-11 | End: 2023-11-21 | Stop reason: HOSPADM

## 2023-11-11 RX ORDER — CITALOPRAM 20 MG/1
40 TABLET, FILM COATED ORAL EVERY MORNING
Status: DISCONTINUED | OUTPATIENT
Start: 2023-11-11 | End: 2023-11-21 | Stop reason: HOSPADM

## 2023-11-11 RX ORDER — LEVOTHYROXINE SODIUM 50 UG/1
50 TABLET ORAL
Status: DISCONTINUED | OUTPATIENT
Start: 2023-11-11 | End: 2023-11-21 | Stop reason: HOSPADM

## 2023-11-11 RX ORDER — METOPROLOL TARTRATE 50 MG/1
100 TABLET ORAL NIGHTLY
Status: DISCONTINUED | OUTPATIENT
Start: 2023-11-11 | End: 2023-11-21 | Stop reason: HOSPADM

## 2023-11-11 RX ADMIN — TRAZODONE HYDROCHLORIDE 200 MG: 100 TABLET ORAL at 09:11

## 2023-11-11 RX ADMIN — Medication 6 MG: at 09:11

## 2023-11-11 RX ADMIN — ENOXAPARIN SODIUM 40 MG: 40 INJECTION SUBCUTANEOUS at 09:11

## 2023-11-11 RX ADMIN — LOSARTAN POTASSIUM 100 MG: 50 TABLET, FILM COATED ORAL at 07:11

## 2023-11-11 RX ADMIN — FUROSEMIDE 40 MG: 40 TABLET ORAL at 07:11

## 2023-11-11 RX ADMIN — CITALOPRAM HYDROBROMIDE 40 MG: 20 TABLET ORAL at 07:11

## 2023-11-11 RX ADMIN — TRAZODONE HYDROCHLORIDE 200 MG: 100 TABLET ORAL at 03:11

## 2023-11-11 RX ADMIN — AMLODIPINE BESYLATE 5 MG: 5 TABLET ORAL at 08:11

## 2023-11-11 RX ADMIN — PIPERACILLIN AND TAZOBACTAM 4.5 G: 4; .5 INJECTION, POWDER, LYOPHILIZED, FOR SOLUTION INTRAVENOUS; PARENTERAL at 10:11

## 2023-11-11 RX ADMIN — PRAVASTATIN SODIUM 40 MG: 40 TABLET ORAL at 08:11

## 2023-11-11 RX ADMIN — ASPIRIN 81 MG: 81 TABLET, COATED ORAL at 08:11

## 2023-11-11 RX ADMIN — LEVOTHYROXINE SODIUM 50 MCG: 50 TABLET ORAL at 07:11

## 2023-11-11 RX ADMIN — LEVOFLOXACIN 750 MG: 750 INJECTION, SOLUTION INTRAVENOUS at 10:11

## 2023-11-11 RX ADMIN — METOPROLOL TARTRATE 100 MG: 50 TABLET, FILM COATED ORAL at 04:11

## 2023-11-11 RX ADMIN — METOPROLOL TARTRATE 100 MG: 50 TABLET, FILM COATED ORAL at 09:11

## 2023-11-11 NOTE — PROGRESS NOTES
[facility]   progress note      Patient Name: Narcisa Perales  MRN: [id number]  Patient Class: IP- Inpatient   Admission Date: 11/10/2023  4:45 PM  Length of Stay: 1  Admitting Service: Hospital Medicine   Attending Physician: Sudarshan Rivera MD   Primary Care Provider: Unique Yancey FNP  History source: EMR, patient and/or patient's family    CHIEF COMPLAINT   Shortness of Breath (Pt to ED with c/o SOB, picked up from urgent care where she was dx with pneumonia. O2 sats 88% RA)    HISTORY OF PRESENT ILLNESS:   Patient is an 83-year-old female with past medical history as below who has had progressive dyspnea over the last several days.  She denied fever or purulent sputum production.  She went to an urgent care today where she was found to be satting 88% on room air and was referred to the ER for further evaluation.  Here she arrived satting in the mid 90s on 2 L nasal cannula but afebrile and hemodynamically stable.  Laboratory work was unremarkable.  CT of the chest however showed left lower lobe and lingula and right middle lobe areas of infiltrate consistent with multifocal pneumonia.  Blood cultures were obtained and she was started on broad-spectrum antibiotics admitted to the hospitalist service for further management.    November 11, 2023-complain of cough and wheezing, no fever, the patient is on OxyMask, complain of generalized weakness    PAST MEDICAL HISTORY:   CAD/stents   Essential hypertension   Type 2 diabetes mellitus  Hypothyroidism, on Synthroid    PAST SURGICAL HISTORY:     Past Surgical History:   Procedure Laterality Date    CARPAL TUNNEL RELEASE      HYSTERECTOMY      JOINT REPLACEMENT      left TKA    rotator cuff Left     TONSILLECTOMY         ALLERGIES:   [place] [serum base no.214 (bulk)] and Sulfa (sulfonamide antibiotics)    FAMILY HISTORY:   Reviewed and non-contributory     SOCIAL HISTORY:     Social History     Tobacco Use    Smoking status:  "Former    Smokeless tobacco: Never   Substance Use Topics    Alcohol use: Not on file        HOME MEDICATIONS:     Prior to Admission medications    Medication Sig Start Date End Date Taking? Authorizing Provider   amLODIPine (NORVASC) 5 MG tablet amlodipine 5 mg tablet 10/7/21   Provider, Historical   citalopram (CELEXA) 40 MG tablet Take 40 mg by mouth every morning. 22   Provider, Historical   furosemide (LASIX) 40 MG tablet Take 40 mg by mouth every morning. 22   Provider, Historical   glipiZIDE (GLUCOTROL) 5 MG TR24 Take 5 mg by mouth every morning. 22   Provider, Historical   ketoconazole (NIZORAL) 2 % shampoo Apply 1 application topically every other day. 22   Provider, Historical   losartan (COZAAR) 100 MG tablet Take 100 mg by mouth every morning. 22   Provider, Historical   metoprolol tartrate (LOPRESSOR) 100 MG tablet SMARTSI Tablet(s) By Mouth Morning-Night 22   Provider, Historical   traZODone (DESYREL) 100 MG tablet Take 200 mg by mouth nightly. 22   Provider, Historical       REVIEW OF SYSTEMS:   Except as documented, all other systems reviewed and negative     PHYSICAL EXAM:   T 98.1 °F (36.7 °C)   BP (!) 151/86   P 81   RR (!) 22   O2 97 %  GENERAL: awake, alert, oriented and in no acute distress, non-toxic appearing   HEENT: normocephalic atraumatic   NECK: supple   LUNGS:  Bilateral wheezing  CVS: Regular rate and rhythm, normal peripheral perfusion  ABD: Soft, non-tender, non-distended, bowel sounds present  EXTREMITIES: no clubbing or cyanosis  SKIN: Warm, dry.   NEURO: alert and oriented, grossly without focal deficits   PSYCHIATRIC: Cooperative    LABS AND IMAGING:     Recent Labs     11/10/23  1712 23  0400   WBC 6.73 4.66   RBC 4.96 4.49   HGB 14.0 12.6   HCT 42.3 38.7   MCV 85.3 86.2   MCH 28.2 28.1   MCHC 33.1 32.6*   RDW 13.3 13.3    172       Recent Labs     11/10/23  1712   LACTIC 0.7       No results for input(s): "INR", "APTT", "D-DIMER" in " "the last 72 hours.  No results for input(s): "HGBA1C", "CHOL", "TRIG", "LDL", "VLDL", "HDL" in the last 72 hours.   Recent Labs     11/10/23  1712 11/11/23  0400    135*   K 4.3 3.3*   CHLORIDE 101 99   CO2 26 29   BUN 10.2 9.4*   CREATININE 0.70 0.63   GLUCOSE 128* 87   CALCIUM 8.7 8.0*   MG 1.60  --    ALBUMIN 3.7 3.2*   GLOBULIN 2.4 2.1*   ALKPHOS 75 65   ALT 16 14   AST 19 16   BILITOT 0.8 0.5       Recent Labs     11/10/23  1712   BNP 53.4   TROPONINI 0.012            CT Chest With Contrast  Narrative: EXAMINATION:  CT CHEST WITH CONTRAST    CLINICAL HISTORY:  Cough, persistent;Sepsis;    TECHNIQUE:  Low dose axial images, sagittal and coronal reformations were obtained from the thoracic inlet to the lung bases. Contrast was  administered.  Automatic exposure control is utilized to reduce patient radiation exposure.    COMPARISON:  None.    FINDINGS:  The lungs are adequately aerated.  No infiltrate is seen.  No mass is seen.  No lesion is seen.  No pleural effusion is seen.  No pleural thickening is seen.  The mediastinum appears grossly unremarkable.  No abnormal lymphadenopathy is seen.  No enhancing lesion is seen.  Thoracic aorta appears grossly unremarkable.  Heart appears normal.    Visualized portions of the upper abdomen shows a cyst in the right kidney.  Impression: Mild patchy infiltrate in the left lower lobe posterior basal aspect    Areas of atelectasis versus developing infiltrate in the lingula and right middle lobe    Follow-up is recommended    Electronically signed by: Cristopher Sevilla  Date:    11/10/2023  Time:    19:53  X-Ray Chest AP Portable  EXAMINATION  XR CHEST AP PORTABLE    CLINICAL HISTORY  Sepsis;    TECHNIQUE  A total of 1 frontal image(s) of the chest.    COMPARISON  23 November 2020    FINDINGS  Lines/tubes/devices: ECG leads overlie the imaged region.  Spinal cord stimulator leads are also again appreciated, similar positioning.    The cardiomediastinal silhouette and " central pulmonary vasculature are unremarkable for utilized technique.  The trachea is midline.  There is similar curvilinear atelectasis versus scarring at the left midlung region.  There is no lobar consolidation, pleural effusion, or pneumothorax.    There is no acute osseous or extrathoracic abnormality.    IMPRESSION  No convincing acute radiographic abnormality.    Electronically signed by: Eris Calderón  Date:    11/10/2023  Time:    19:11      ASSESSMENT & PLAN:   Community-acquired pneumonia  Acute hypoxemic respiratory failure secondary to above     HX:  HTN, DM2, HLD, CAD/stents, hypothyroidism    -blood cultures, respiratory PCR, MRSA PCR   -continue IV Levaquin for community-acquired pneumonia   -nebs and oxygen wean as tolerated   -resume home medications  -consult physical therapy and occupational therapy for weakness       DVT prophylaxis: lovenox  Code status: full

## 2023-11-11 NOTE — ED PROVIDER NOTES
Encounter Date: 11/10/2023       History     Chief Complaint   Patient presents with    Shortness of Breath     Pt to ED with c/o SOB, picked up from urgent care where she was dx with pneumonia. O2 sats 88% RA     Narcisa Perales is a 83 y.o. female with a past medical history of HTN who presents to the ED for progressively worsening shortness of breath over the past couple of days.  She went to an urgent care today where she had an x-ray that was consistent with an ammonia so she was referred to the emergency department for further evaluation and management.  She reports continued shortness of breath although she was feeling like she is starting to improve on nasal cannula oxygen currently.  She reports fevers at home.  She reports a productive cough.  She denies any chest pain.         Review of patient's allergies indicates:   Allergen Reactions    [place] [serum base no.214 (bulk)]     Sulfa (sulfonamide antibiotics)      Past Medical History:   Diagnosis Date    Carpal tunnel syndrome     Hypertension      Past Surgical History:   Procedure Laterality Date    CARPAL TUNNEL RELEASE      HYSTERECTOMY      JOINT REPLACEMENT      left TKA    rotator cuff Left     TONSILLECTOMY       No family history on file.  Social History     Tobacco Use    Smoking status: Former    Smokeless tobacco: Never   Substance Use Topics    Drug use: Never     Review of Systems   Constitutional:  Negative for chills and fever.   HENT:  Negative for congestion, drooling and sore throat.    Eyes:  Negative for pain and visual disturbance.   Respiratory:  Positive for cough, chest tightness, shortness of breath and wheezing.    Cardiovascular:  Negative for chest pain, palpitations and leg swelling.   Gastrointestinal:  Negative for abdominal pain, nausea and vomiting.   Genitourinary:  Negative for dysuria and hematuria.   Musculoskeletal:  Negative for back pain, neck pain and neck stiffness.   Skin:  Negative for pallor and rash.    Neurological:  Negative for weakness and numbness.   Hematological:  Does not bruise/bleed easily.       Physical Exam     Initial Vitals [11/10/23 1640]   BP Pulse Resp Temp SpO2   (!) 147/67 (!) 57 (!) 26 98.3 °F (36.8 °C) 95 %      MAP       --         Physical Exam    Nursing note and vitals reviewed.  Constitutional: She appears well-developed and well-nourished. She is not diaphoretic. No distress.   HENT:   Head: Normocephalic and atraumatic.   Nose: Nose normal.   Mouth/Throat: Oropharynx is clear and moist.   Eyes: EOM are normal. Pupils are equal, round, and reactive to light.   Neck: Neck supple.   Normal range of motion.  Cardiovascular:  Normal rate and regular rhythm.           No murmur heard.  Pulmonary/Chest: No respiratory distress. She has wheezes. She has rales.   Abdominal: Abdomen is soft. She exhibits no distension. There is no abdominal tenderness.   Musculoskeletal:      Cervical back: Normal range of motion and neck supple.     Neurological: She is alert and oriented to person, place, and time. She has normal strength. No cranial nerve deficit or sensory deficit.   Skin: Skin is warm. Capillary refill takes less than 2 seconds. No rash noted.         ED Course   Critical Care    Date/Time: 11/10/2023 8:42 PM    Performed by: Rony Ceron MD  Authorized by: Rony Ceron MD  Direct patient critical care time: 40 minutes  Total critical care time (exclusive of procedural time) : 40 minutes  Critical care time was exclusive of separately billable procedures and treating other patients.  Critical care was necessary to treat or prevent imminent or life-threatening deterioration of the following conditions: respiratory failure and sepsis.  Critical care was time spent personally by me on the following activities: development of treatment plan with patient or surrogate, discussions with consultants, evaluation of patient's response to treatment, examination of patient, obtaining  history from patient or surrogate, ordering and performing treatments and interventions, ordering and review of laboratory studies, ordering and review of radiographic studies, pulse oximetry, re-evaluation of patient's condition and review of old charts.        Labs Reviewed   COMPREHENSIVE METABOLIC PANEL - Abnormal; Notable for the following components:       Result Value    Glucose Level 128 (*)     All other components within normal limits   LACTIC ACID, PLASMA - Normal   TROPONIN I - Normal   MAGNESIUM - Normal   B-TYPE NATRIURETIC PEPTIDE - Normal   BLOOD CULTURE OLG   BLOOD CULTURE OLG   CBC W/ AUTO DIFFERENTIAL    Narrative:     The following orders were created for panel order CBC auto differential.  Procedure                               Abnormality         Status                     ---------                               -----------         ------                     CBC with Differential[[id number]]                            Final result                 Please view results for these tests on the individual orders.   CBC WITH DIFFERENTIAL   URINALYSIS, REFLEX TO URINE CULTURE   COVID/FLU A&B PCR          Imaging Results              CT Chest With Contrast (Final result)  Result time 11/10/23 19:53:16      Final result by Cristopher Sevilla MD (11/10/23 19:53:16)                   Impression:      Mild patchy infiltrate in the left lower lobe posterior basal aspect    Areas of atelectasis versus developing infiltrate in the lingula and right middle lobe    Follow-up is recommended      Electronically signed by: Cristopher Sevilla  Date:    11/10/2023  Time:    19:53               Narrative:    EXAMINATION:  CT CHEST WITH CONTRAST    CLINICAL HISTORY:  Cough, persistent;Sepsis;    TECHNIQUE:  Low dose axial images, sagittal and coronal reformations were obtained from the thoracic inlet to the lung bases. Contrast was  administered.  Automatic exposure control is utilized to reduce patient radiation  exposure.    COMPARISON:  None.    FINDINGS:  The lungs are adequately aerated.  No infiltrate is seen.  No mass is seen.  No lesion is seen.  No pleural effusion is seen.  No pleural thickening is seen.  The mediastinum appears grossly unremarkable.  No abnormal lymphadenopathy is seen.  No enhancing lesion is seen.  Thoracic aorta appears grossly unremarkable.  Heart appears normal.    Visualized portions of the upper abdomen shows a cyst in the right kidney.                                       X-Ray Chest AP Portable (Final result)  Result time 11/10/23 19:11:08      Final result by Eris Calderón MD (11/10/23 19:11:08)                   Narrative:    EXAMINATION  XR CHEST AP PORTABLE    CLINICAL HISTORY  Sepsis;    TECHNIQUE  A total of 1 frontal image(s) of the chest.    COMPARISON  23 November 2020    FINDINGS  Lines/tubes/devices: ECG leads overlie the imaged region.  Spinal cord stimulator leads are also again appreciated, similar positioning.    The cardiomediastinal silhouette and central pulmonary vasculature are unremarkable for utilized technique.  The trachea is midline.  There is similar curvilinear atelectasis versus scarring at the left midlung region.  There is no lobar consolidation, pleural effusion, or pneumothorax.    There is no acute osseous or extrathoracic abnormality.    IMPRESSION  No convincing acute radiographic abnormality.      Electronically signed by: Eris Calderón  Date:    11/10/2023  Time:    19:11                                     Medications   piperacillin-tazobactam (ZOSYN) 4.5 g in dextrose 5 % in water (D5W) 100 mL IVPB (MB+) (0 g Intravenous Stopped 11/10/23 1953)   vancomycin 2 g in dextrose 5 % 500 mL IVPB (2,000 mg Intravenous New Bag 11/10/23 1930)   lactated ringers bolus 1,365 mL (0 mLs Intravenous Stopped 11/10/23 1959)   iopamidoL (ISOVUE-370) injection 100 mL (100 mLs Intravenous Given 11/10/23 1940)   albuterol nebulizer solution 10 mg (10 mg Nebulization  "Given 11/10/23 1958)   ipratropium 0.02 % nebulizer solution 0.5 mg (0.5 mg Nebulization Given 11/10/23 1959)   albuterol (PROVENTIL) 2.5 mg /3 mL (0.083 %) nebulizer solution (2.5 mg  Given 11/10/23 2019)     Medical Decision Making  Amount and/or Complexity of Data Reviewed  Labs: ordered.  Radiology: ordered. Decision-making details documented in ED Course.    Risk  Prescription drug management.  Decision regarding hospitalization.    Differential diagnosis (includes but is not limited to):   Pneumonia, sepsis, pneumothorax, ACS, arrhythmia, volume overload, kidney injury, dehydration    MDM Narrative  83-year-old female presents for evaluation of steadily worsening shortness of breath associated with fevers and a productive cough at home.  Patient went to an urgent care today where she underwent an x-ray that showed a lingular infiltrate and she was referred to the emergency department for further evaluation.  Patient is currently tachypneic and requiring nasal cannula oxygen supplementation to maintain normal oxygen saturations.  Repeat chest x-ray reviewed.  Labs are pending.  Infectious workup including blood cultures and lactic acid pending.  CT of the chest pending for further evaluation of the patient's infiltrates.  Antibacterial coverage initiated.    Update:  Labs reviewed and are fairly unremarkable.  CT scan does show evidence of infectious appearing opacities bilaterally.  Continue antibiotics.  Patient was having some wheezing bilaterally, she denies any history of asthma or COPD however will proceed with bronchodilators and reassess symptom control.  Patient appears much more comfortable on nasal cannula oxygen.  Case discussed with hospital medicine, patient has been accepted for admission.    Dispo:  Admit    My independent radiology interpretation:  As above  Point of care US (independently performed and interpreted):   Decision rules/clinical scoring:     Sepsis Perfusion Assessment: "I attest " "a sepsis perfusion exam was performed within 6 hours of sepsis, severe sepsis, or septic shock presentation, following fluid resuscitation."     Amount and/or Complexity of Data Reviewed  Independent historian: none   Summary of history:   External data reviewed: notes from previous admissions, notes from previous ED visits, and notes from clinic visits  Summary of data reviewed:  Prior records reviewed.  Urgent care note reviewed.  Risk and benefits of testing: discussed   Labs: ordered and reviewed  Radiology: ordered and independent interpretation performed (see above or ED course)  ECG/medicine tests: ordered and independent interpretation performed (see above or ED course)  Discussion of management or test interpretation with external provider(s): discussed with hospitalist physician   Summary of discussion:  As above    Risk  Parenteral controlled substances   Drug therapy requiring intense monitoring for toxicity   Decision regarding hospitalization  Shared decision making     Critical Care  30-74 minutes     Data Reviewed/Counseling: I have personally reviewed the patient's vital signs, nursing notes, and other relevant tests, information, and imaging. I had a detailed discussion regarding the historical points, exam findings, and any diagnostic results supporting the discharge diagnosis. I personally performed the history, PE, MDM and procedures as documented above and agree with the scribe's documentation.    Portions of this note were dictated using voice recognition software. Although it was reviewed for accuracy, some inherent voice recognition errors may have occurred and may be present in this document.            ED Course as of 11/10/23 2105   Fri Nov 10, 2023   1659 EKG independently interpreted by me.  EKG: SB @ 55, no STEMI, QTc 409 [MC]   1800 X-Ray Chest AP Portable  Independently visualized/reviewed by me during the ED visit.  - No lobar consolidation, no PTX []      ED Course User " Index  [MC] Rony Ceron MD                    Clinical Impression:   Final diagnoses:  [R09.02] Hypoxia  [J18.9] Pneumonia of both lungs due to infectious organism, unspecified part of lung (Primary)        ED Disposition Condition    Admit Stable                Rony Ceron MD  11/10/23 2046       Rony Ceron MD  11/10/23 2105

## 2023-11-11 NOTE — H&P
[facility] Hospital Medicine History & Physical Examination       Patient Name: Narcisa Perales  MRN: [id number]  Patient Class: IP- Inpatient   Admission Date: 11/10/2023  4:45 PM  Length of Stay: 0  Admitting Service: Hospital Medicine   Attending Physician: Sudarshan Rivera MD   Primary Care Provider: Unique Yancey FNP  History source: EMR, patient and/or patient's family    CHIEF COMPLAINT   Shortness of Breath (Pt to ED with c/o SOB, picked up from urgent care where she was dx with pneumonia. O2 sats 88% RA)    HISTORY OF PRESENT ILLNESS:   Patient is an 83-year-old female with past medical history as below who has had progressive dyspnea over the last several days.  She denied fever or purulent sputum production.  She went to an urgent care today where she was found to be satting 88% on room air and was referred to the ER for further evaluation.  Here she arrived satting in the mid 90s on 2 L nasal cannula but afebrile and hemodynamically stable.  Laboratory work was unremarkable.  CT of the chest however showed left lower lobe and lingula and right middle lobe areas of infiltrate consistent with multifocal pneumonia.  Blood cultures were obtained and she was started on broad-spectrum antibiotics admitted to the hospitalist service for further management.    PAST MEDICAL HISTORY:   CAD/stents   Essential hypertension   Type 2 diabetes mellitus  Hypothyroidism, on Synthroid    PAST SURGICAL HISTORY:     Past Surgical History:   Procedure Laterality Date    CARPAL TUNNEL RELEASE      HYSTERECTOMY      JOINT REPLACEMENT      left TKA    rotator cuff Left     TONSILLECTOMY         ALLERGIES:   [place] [serum base no.214 (bulk)] and Sulfa (sulfonamide antibiotics)    FAMILY HISTORY:   Reviewed and non-contributory     SOCIAL HISTORY:     Social History     Tobacco Use    Smoking status: Former    Smokeless tobacco: Never   Substance Use Topics    Alcohol use: Not on file  "       HOME MEDICATIONS:     Prior to Admission medications    Medication Sig Start Date End Date Taking? Authorizing Provider   amLODIPine (NORVASC) 5 MG tablet amlodipine 5 mg tablet 10/7/21   Provider, Historical   citalopram (CELEXA) 40 MG tablet Take 40 mg by mouth every morning. 22   Provider, Historical   furosemide (LASIX) 40 MG tablet Take 40 mg by mouth every morning. 22   Provider, Historical   glipiZIDE (GLUCOTROL) 5 MG TR24 Take 5 mg by mouth every morning. 22   Provider, Historical   ketoconazole (NIZORAL) 2 % shampoo Apply 1 application topically every other day. 22   Provider, Historical   losartan (COZAAR) 100 MG tablet Take 100 mg by mouth every morning. 22   Provider, Historical   metoprolol tartrate (LOPRESSOR) 100 MG tablet SMARTSI Tablet(s) By Mouth Morning-Night 22   Provider, Historical   traZODone (DESYREL) 100 MG tablet Take 200 mg by mouth nightly. 22   Provider, Historical       REVIEW OF SYSTEMS:   Except as documented, all other systems reviewed and negative     PHYSICAL EXAM:   T 98.3 °F (36.8 °C)   BP (!) 147/67   P 73   RR (!) 21   O2 97 %  GENERAL: awake, alert, oriented and in no acute distress, non-toxic appearing   HEENT: normocephalic atraumatic   NECK: supple   LUNGS: Clear bilaterally, no wheezing or rales, no accessory muscle use   CVS: Regular rate and rhythm, normal peripheral perfusion  ABD: Soft, non-tender, non-distended, bowel sounds present  EXTREMITIES: no clubbing or cyanosis  SKIN: Warm, dry.   NEURO: alert and oriented, grossly without focal deficits   PSYCHIATRIC: Cooperative    LABS AND IMAGING:     Recent Labs     11/10/23  1712   WBC 6.73   RBC 4.96   HGB 14.0   HCT 42.3   MCV 85.3   MCH 28.2   MCHC 33.1   RDW 13.3        Recent Labs     11/10/23  1712   LACTIC 0.7     No results for input(s): "INR", "APTT", "D-DIMER" in the last 72 hours.  No results for input(s): "HGBA1C", "CHOL", "TRIG", "LDL", "VLDL", "HDL" in the last " 72 hours.   Recent Labs     11/10/23  1712      K 4.3   CHLORIDE 101   CO2 26   BUN 10.2   CREATININE 0.70   GLUCOSE 128*   CALCIUM 8.7   MG 1.60   ALBUMIN 3.7   GLOBULIN 2.4   ALKPHOS 75   ALT 16   AST 19   BILITOT 0.8     Recent Labs     11/10/23  1712   BNP 53.4   TROPONINI 0.012          CT Chest With Contrast  Narrative: EXAMINATION:  CT CHEST WITH CONTRAST    CLINICAL HISTORY:  Cough, persistent;Sepsis;    TECHNIQUE:  Low dose axial images, sagittal and coronal reformations were obtained from the thoracic inlet to the lung bases. Contrast was  administered.  Automatic exposure control is utilized to reduce patient radiation exposure.    COMPARISON:  None.    FINDINGS:  The lungs are adequately aerated.  No infiltrate is seen.  No mass is seen.  No lesion is seen.  No pleural effusion is seen.  No pleural thickening is seen.  The mediastinum appears grossly unremarkable.  No abnormal lymphadenopathy is seen.  No enhancing lesion is seen.  Thoracic aorta appears grossly unremarkable.  Heart appears normal.    Visualized portions of the upper abdomen shows a cyst in the right kidney.  Impression: Mild patchy infiltrate in the left lower lobe posterior basal aspect    Areas of atelectasis versus developing infiltrate in the lingula and right middle lobe    Follow-up is recommended    Electronically signed by: Cristopher Sevilla  Date:    11/10/2023  Time:    19:53  X-Ray Chest AP Portable  EXAMINATION  XR CHEST AP PORTABLE    CLINICAL HISTORY  Sepsis;    TECHNIQUE  A total of 1 frontal image(s) of the chest.    COMPARISON  23 November 2020    FINDINGS  Lines/tubes/devices: ECG leads overlie the imaged region.  Spinal cord stimulator leads are also again appreciated, similar positioning.    The cardiomediastinal silhouette and central pulmonary vasculature are unremarkable for utilized technique.  The trachea is midline.  There is similar curvilinear atelectasis versus scarring at the left midlung region.   There is no lobar consolidation, pleural effusion, or pneumothorax.    There is no acute osseous or extrathoracic abnormality.    IMPRESSION  No convincing acute radiographic abnormality.    Electronically signed by: Eris Calderón  Date:    11/10/2023  Time:    19:11      ASSESSMENT & PLAN:   Community-acquired pneumonia  Acute hypoxemic respiratory failure secondary to above     HX:  HTN, DM2, HLD, CAD/stents, hypothyroidism    -blood cultures, respiratory PCR, MRSA PCR   -continue empiric antibiotics for community-acquired pneumonia   -nebs and oxygen wean as tolerated   -resume home medications pending med rec    DVT prophylaxis: lovenox  Code status: full     If patient was admitted under observational status it is with my approval/permission.     At least 55 min was spent on this history and physical.  Time seen: 11PM 11/10/23  Critical care time = 35 min; Critical care diagnosis = hypoxia   Sudarshan Rivera MD

## 2023-11-12 LAB
ANION GAP SERPL CALC-SCNC: 8 MEQ/L
BASOPHILS # BLD AUTO: 0.01 X10(3)/MCL
BASOPHILS NFR BLD AUTO: 0.2 %
BUN SERPL-MCNC: 9.9 MG/DL (ref 9.8–20.1)
CALCIUM SERPL-MCNC: 8.1 MG/DL (ref 8.4–10.2)
CHLORIDE SERPL-SCNC: 97 MMOL/L (ref 98–107)
CO2 SERPL-SCNC: 30 MMOL/L (ref 23–31)
CREAT SERPL-MCNC: 0.68 MG/DL (ref 0.55–1.02)
CREAT/UREA NIT SERPL: 15
EOSINOPHIL # BLD AUTO: 0.07 X10(3)/MCL (ref 0–0.9)
EOSINOPHIL NFR BLD AUTO: 1.3 %
ERYTHROCYTE [DISTWIDTH] IN BLOOD BY AUTOMATED COUNT: 13.3 % (ref 11.5–17)
GFR SERPLBLD CREATININE-BSD FMLA CKD-EPI: >60 MLS/MIN/1.73/M2
GLUCOSE SERPL-MCNC: 139 MG/DL (ref 82–115)
HCT VFR BLD AUTO: 37.4 % (ref 37–47)
HGB BLD-MCNC: 12.3 G/DL (ref 12–16)
IMM GRANULOCYTES # BLD AUTO: 0.01 X10(3)/MCL (ref 0–0.04)
IMM GRANULOCYTES NFR BLD AUTO: 0.2 %
LYMPHOCYTES # BLD AUTO: 1.07 X10(3)/MCL (ref 0.6–4.6)
LYMPHOCYTES NFR BLD AUTO: 19.7 %
MCH RBC QN AUTO: 29.2 PG (ref 27–31)
MCHC RBC AUTO-ENTMCNC: 32.9 G/DL (ref 33–36)
MCV RBC AUTO: 88.8 FL (ref 80–94)
MONOCYTES # BLD AUTO: 0.59 X10(3)/MCL (ref 0.1–1.3)
MONOCYTES NFR BLD AUTO: 10.8 %
NEUTROPHILS # BLD AUTO: 3.69 X10(3)/MCL (ref 2.1–9.2)
NEUTROPHILS NFR BLD AUTO: 67.8 %
NRBC BLD AUTO-RTO: 0 %
PLATELET # BLD AUTO: 179 X10(3)/MCL (ref 130–400)
PMV BLD AUTO: 8.9 FL (ref 7.4–10.4)
POCT GLUCOSE: 114 MG/DL (ref 70–110)
POTASSIUM SERPL-SCNC: 3.6 MMOL/L (ref 3.5–5.1)
RBC # BLD AUTO: 4.21 X10(6)/MCL (ref 4.2–5.4)
SODIUM SERPL-SCNC: 135 MMOL/L (ref 136–145)
WBC # SPEC AUTO: 5.44 X10(3)/MCL (ref 4.5–11.5)

## 2023-11-12 PROCEDURE — 25000003 PHARM REV CODE 250: Performed by: INTERNAL MEDICINE

## 2023-11-12 PROCEDURE — 63600175 PHARM REV CODE 636 W HCPCS: Performed by: INTERNAL MEDICINE

## 2023-11-12 PROCEDURE — 85025 COMPLETE CBC W/AUTO DIFF WBC: CPT | Performed by: INTERNAL MEDICINE

## 2023-11-12 PROCEDURE — 80048 BASIC METABOLIC PNL TOTAL CA: CPT | Performed by: INTERNAL MEDICINE

## 2023-11-12 PROCEDURE — 21400001 HC TELEMETRY ROOM

## 2023-11-12 PROCEDURE — 97162 PT EVAL MOD COMPLEX 30 MIN: CPT

## 2023-11-12 RX ORDER — BUDESONIDE 0.5 MG/2ML
0.25 INHALANT ORAL DAILY
Status: DISCONTINUED | OUTPATIENT
Start: 2023-11-13 | End: 2023-11-21 | Stop reason: HOSPADM

## 2023-11-12 RX ORDER — POLYETHYLENE GLYCOL 3350 17 G/17G
17 POWDER, FOR SOLUTION ORAL 2 TIMES DAILY PRN
Status: DISCONTINUED | OUTPATIENT
Start: 2023-11-12 | End: 2023-11-21 | Stop reason: HOSPADM

## 2023-11-12 RX ORDER — IPRATROPIUM BROMIDE AND ALBUTEROL SULFATE 2.5; .5 MG/3ML; MG/3ML
3 SOLUTION RESPIRATORY (INHALATION) EVERY 6 HOURS PRN
Status: DISCONTINUED | OUTPATIENT
Start: 2023-11-12 | End: 2023-11-21 | Stop reason: HOSPADM

## 2023-11-12 RX ADMIN — LEVOTHYROXINE SODIUM 50 MCG: 50 TABLET ORAL at 06:11

## 2023-11-12 RX ADMIN — CITALOPRAM HYDROBROMIDE 40 MG: 20 TABLET ORAL at 06:11

## 2023-11-12 RX ADMIN — ACETAMINOPHEN 650 MG: 325 TABLET, FILM COATED ORAL at 06:11

## 2023-11-12 RX ADMIN — LEVOFLOXACIN 750 MG: 750 INJECTION, SOLUTION INTRAVENOUS at 11:11

## 2023-11-12 RX ADMIN — Medication 6 MG: at 08:11

## 2023-11-12 RX ADMIN — BENZONATATE 100 MG: 100 CAPSULE ORAL at 11:11

## 2023-11-12 RX ADMIN — AMLODIPINE BESYLATE 5 MG: 5 TABLET ORAL at 08:11

## 2023-11-12 RX ADMIN — VANCOMYCIN HYDROCHLORIDE 1000 MG: 1 INJECTION, POWDER, LYOPHILIZED, FOR SOLUTION INTRAVENOUS at 08:11

## 2023-11-12 RX ADMIN — METOPROLOL TARTRATE 100 MG: 50 TABLET, FILM COATED ORAL at 08:11

## 2023-11-12 RX ADMIN — PIPERACILLIN AND TAZOBACTAM 4.5 G: 4; .5 INJECTION, POWDER, LYOPHILIZED, FOR SOLUTION INTRAVENOUS; PARENTERAL at 05:11

## 2023-11-12 RX ADMIN — ASPIRIN 81 MG: 81 TABLET, COATED ORAL at 08:11

## 2023-11-12 RX ADMIN — PRAVASTATIN SODIUM 40 MG: 40 TABLET ORAL at 08:11

## 2023-11-12 RX ADMIN — VANCOMYCIN HYDROCHLORIDE 1000 MG: 1 INJECTION, POWDER, LYOPHILIZED, FOR SOLUTION INTRAVENOUS at 09:11

## 2023-11-12 RX ADMIN — TRAZODONE HYDROCHLORIDE 200 MG: 100 TABLET ORAL at 08:11

## 2023-11-12 RX ADMIN — FUROSEMIDE 40 MG: 40 TABLET ORAL at 06:11

## 2023-11-12 RX ADMIN — ENOXAPARIN SODIUM 40 MG: 40 INJECTION SUBCUTANEOUS at 08:11

## 2023-11-12 NOTE — PT/OT/SLP EVAL
Physical Therapy Evaluation    Patient Name:  Narcisa Perales   MRN:  [id number]    Recommendations:     Discharge Recommendations: Low Intensity Therapy   Discharge Equipment Recommendations: walker, rolling   Barriers to discharge:  acuity of medical condition    Assessment:     Narcisa Perales is a 83 y.o. female admitted with a medical diagnosis of Pneumonia of both lungs due to infectious organism.  She presents with the following impairments/functional limitations: weakness, impaired endurance, impaired functional mobility, impaired balance, decreased lower extremity function .    Rehab Prognosis: Good; patient would benefit from acute skilled PT services to address these deficits and reach maximum level of function.    Recent Surgery: * No surgery found *      Plan:     During this hospitalization, patient to be seen 5 x/week to address the identified rehab impairments via gait training, therapeutic activities, therapeutic exercises, neuromuscular re-education and progress toward the following goals:    Plan of Care Expires:  12/10/23    Subjective     Chief Complaint: difficulty moving around  Patient/Family Comments/goals: to get back on my feet  Pain/Comfort:  Pain Rating 1: 0/10    Patients cultural, spiritual, Holiness conflicts given the current situation: no    Living Environment:  Lives at home alone, in a [facility]  Prior to admission, patients level of function was fully independent in ADL and not using AD fo rmobility.  Equipment used at home: none.  Upon discharge, patient will have assistance from family.    Objective:     Communicated with nursing prior to session.  Patient found HOB elevated with peripheral IV, oxygen, telemetry  upon PT entry to room.    General Precautions: Standard, fall  BP  143/71, HR 64, )2sat 97%    Respiratory Status:  using oxymask at 8li    Exams:  RLE ROM: WFL  RLE Strength: WFL  LLE ROM: WFL  LLE Strength: WFL    Functional Mobility:  Bed Mobility:     Supine to Sit:  minimum assistance and of 2x persons  Transfers:     Sit to Stand:  minimum assistance with rolling walker  Bed to Chair: minimum assistance with  rolling walker  using  Step Transfer  Gait: ambulates using FWW with min assist x60ft      AM-PAC 6 CLICK MOBILITY  Total Score:16       Patient left up in chair with all lines intact and call button in reach.    GOALS:   Multidisciplinary Problems       Physical Therapy Goals          Problem: Physical Therapy    Goal Priority Disciplines Outcome Goal Variances Interventions   Physical Therapy Goal     PT, PT/OT Ongoing, Progressing     Description: Goals to be met by: 12/10/23     Patient will increase functional independence with mobility by performin. Supine to sit with Stand-by Assistance  2. Sit to supine with Stand-by Assistance  3. Sit to stand transfer with Stand-by Assistance  4. Bed to chair transfer with Stand-by Assistance using Rolling Walker  5. Gait  x 150 feet with Stand-by Assistance using Rolling Walker.                          History:     Past Medical History:   Diagnosis Date    Carpal tunnel syndrome     Hypertension        Past Surgical History:   Procedure Laterality Date    CARPAL TUNNEL RELEASE      HYSTERECTOMY      JOINT REPLACEMENT      left TKA    rotator cuff Left     TONSILLECTOMY         Time Tracking:     PT Received On:    PT Start Time: 0935     PT Stop Time: 1000  PT Total Time (min): 25 min     Billable Minutes: Evaluation moderate complexity      2023

## 2023-11-12 NOTE — PROGRESS NOTES
[facility]   progress note      Patient Name: Narcisa Perales  MRN: [id number]  Patient Class: IP- Inpatient   Admission Date: 11/10/2023  4:45 PM  Length of Stay: 2  Admitting Service: Hospital Medicine   Attending Physician: Sudarshan Rivera MD   Primary Care Provider: Unique Yancey FNP  History source: EMR, patient and/or patient's family    CHIEF COMPLAINT   Shortness of Breath (Pt to ED with c/o SOB, picked up from urgent care where she was dx with pneumonia. O2 sats 88% RA)    HISTORY OF PRESENT ILLNESS:   Patient is an 83-year-old female with past medical history as below who has had progressive dyspnea over the last several days.  She denied fever or purulent sputum production.  She went to an urgent care today where she was found to be satting 88% on room air and was referred to the ER for further evaluation.  Here she arrived satting in the mid 90s on 2 L nasal cannula but afebrile and hemodynamically stable.  Laboratory work was unremarkable.  CT of the chest however showed left lower lobe and lingula and right middle lobe areas of infiltrate consistent with multifocal pneumonia.  Blood cultures were obtained and she was started on broad-spectrum antibiotics admitted to the hospitalist service for further management.    November 11, 2023-complain of cough and wheezing, no fever, the patient is on OxyMask, complain of generalized weakness    November 12, 2023-patient is on OxyMask, patient feels better, decreased cough and wheezing, patient is afebrile    PAST MEDICAL HISTORY:   CAD/stents   Essential hypertension   Type 2 diabetes mellitus  Hypothyroidism, on Synthroid    PAST SURGICAL HISTORY:     Past Surgical History:   Procedure Laterality Date    CARPAL TUNNEL RELEASE      HYSTERECTOMY      JOINT REPLACEMENT      left TKA    rotator cuff Left     TONSILLECTOMY         ALLERGIES:   [place] [serum base no.214 (bulk)] and Sulfa (sulfonamide  antibiotics)    FAMILY HISTORY:   Reviewed and non-contributory     SOCIAL HISTORY:     Social History     Tobacco Use    Smoking status: Former    Smokeless tobacco: Never   Substance Use Topics    Alcohol use: Not on file        HOME MEDICATIONS:     Prior to Admission medications    Medication Sig Start Date End Date Taking? Authorizing Provider   amLODIPine (NORVASC) 5 MG tablet amlodipine 5 mg tablet 10/7/21   Provider, Historical   citalopram (CELEXA) 40 MG tablet Take 40 mg by mouth every morning. 22   Provider, Historical   furosemide (LASIX) 40 MG tablet Take 40 mg by mouth every morning. 22   Provider, Historical   glipiZIDE (GLUCOTROL) 5 MG TR24 Take 5 mg by mouth every morning. 22   Provider, Historical   ketoconazole (NIZORAL) 2 % shampoo Apply 1 application topically every other day. 22   Provider, Historical   losartan (COZAAR) 100 MG tablet Take 100 mg by mouth every morning. 22   Provider, Historical   metoprolol tartrate (LOPRESSOR) 100 MG tablet SMARTSI Tablet(s) By Mouth Morning-Night 22   Provider, Historical   traZODone (DESYREL) 100 MG tablet Take 200 mg by mouth nightly. 22   Provider, Historical       REVIEW OF SYSTEMS:   Except as documented, all other systems reviewed and negative     PHYSICAL EXAM:   T 98.8 °F (37.1 °C)   /69   P 61   RR 18   O2 96 %  GENERAL: awake, alert, oriented and in no acute distress, non-toxic appearing   HEENT: normocephalic atraumatic   NECK: supple   LUNGS:  Bilateral wheezing  CVS: Regular rate and rhythm, normal peripheral perfusion  ABD: Soft, non-tender, non-distended, bowel sounds present  EXTREMITIES: no clubbing or cyanosis  SKIN: Warm, dry.   NEURO: alert and oriented, grossly without focal deficits   PSYCHIATRIC: Cooperative    LABS AND IMAGING:     Recent Labs     23  0400 23  0722   WBC 4.66 5.44   RBC 4.49 4.21   HGB 12.6 12.3   HCT 38.7 37.4   MCV 86.2 88.8   MCH 28.1 29.2   MCHC 32.6* 32.9*   RDW  "13.3 13.3    179       Recent Labs     11/10/23  1712   LACTIC 0.7       No results for input(s): "INR", "APTT", "D-DIMER" in the last 72 hours.  No results for input(s): "HGBA1C", "CHOL", "TRIG", "LDL", "VLDL", "HDL" in the last 72 hours.   Recent Labs     11/10/23  1712 11/11/23  0400 11/12/23  0722    135* 135*   K 4.3 3.3* 3.6   CHLORIDE 101 99 97*   CO2 26 29 30   BUN 10.2 9.4* 9.9   CREATININE 0.70 0.63 0.68   GLUCOSE 128* 87 139*   CALCIUM 8.7 8.0* 8.1*   MG 1.60  --   --    ALBUMIN 3.7 3.2*  --    GLOBULIN 2.4 2.1*  --    ALKPHOS 75 65  --    ALT 16 14  --    AST 19 16  --    BILITOT 0.8 0.5  --        Recent Labs     11/10/23  1712   BNP 53.4   TROPONINI 0.012            CT Chest With Contrast  Narrative: EXAMINATION:  CT CHEST WITH CONTRAST    CLINICAL HISTORY:  Cough, persistent;Sepsis;    TECHNIQUE:  Low dose axial images, sagittal and coronal reformations were obtained from the thoracic inlet to the lung bases. Contrast was  administered.  Automatic exposure control is utilized to reduce patient radiation exposure.    COMPARISON:  None.    FINDINGS:  The lungs are adequately aerated.  No infiltrate is seen.  No mass is seen.  No lesion is seen.  No pleural effusion is seen.  No pleural thickening is seen.  The mediastinum appears grossly unremarkable.  No abnormal lymphadenopathy is seen.  No enhancing lesion is seen.  Thoracic aorta appears grossly unremarkable.  Heart appears normal.    Visualized portions of the upper abdomen shows a cyst in the right kidney.  Impression: Mild patchy infiltrate in the left lower lobe posterior basal aspect    Areas of atelectasis versus developing infiltrate in the lingula and right middle lobe    Follow-up is recommended    Electronically signed by: Cristopher Sevilla  Date:    11/10/2023  Time:    19:53  X-Ray Chest AP Portable  EXAMINATION  XR CHEST AP PORTABLE    CLINICAL HISTORY  Sepsis;    TECHNIQUE  A total of 1 frontal image(s) of the " chest.    COMPARISON  23 November 2020    FINDINGS  Lines/tubes/devices: ECG leads overlie the imaged region.  Spinal cord stimulator leads are also again appreciated, similar positioning.    The cardiomediastinal silhouette and central pulmonary vasculature are unremarkable for utilized technique.  The trachea is midline.  There is similar curvilinear atelectasis versus scarring at the left midlung region.  There is no lobar consolidation, pleural effusion, or pneumothorax.    There is no acute osseous or extrathoracic abnormality.    IMPRESSION  No convincing acute radiographic abnormality.    Electronically signed by: Eris Calderón  Date:    11/10/2023  Time:    19:11      ASSESSMENT & PLAN:   Community-acquired pneumonia  Acute hypoxemic respiratory failure secondary to above     HX:  HTN, DM2, HLD, CAD/stents, hypothyroidism    -wean off OxyMask  -blood cultures shows 1/2 bottles Staphylococcus positive, respiratory culture negative, urine culture negative  -continue IV Levaquin and IV vancomycin  -nebs and oxygen wean as tolerated   -continue DuoNeb and Pulmicort  -consult physical therapy and occupational therapy for weakness       DVT prophylaxis: lovenox  Code status: full

## 2023-11-12 NOTE — NURSING
Nurses Note -- 4 Eyes      11/11/2023   6:30 PM      Skin assessed during: Admit      [x] No Altered Skin Integrity Present    [x]Prevention Measures Documented      [] Yes- Altered Skin Integrity Present or Discovered   [] LDA Added if Not in Epic (Describe Wound)   [] New Altered Skin Integrity was Present on Admit and Documented in LDA   [] Wound Image Taken    Wound Care Consulted? No    Attending Nurse:  Ami Doll RN/Staff Member:  Blessing

## 2023-11-12 NOTE — PROGRESS NOTES
"Pharmacokinetic Initial Assessment: IV Vancomycin    Assessment/Plan:    Initiate intravenous vancomycin with loading dose of 2000 mg once followed by a maintenance dose of vancomycin 1000mg IV every 12 hours  Desired empiric serum trough concentration is 10 to 20 mcg/mL  Draw vancomycin trough on 11/13 at 0700.  Pharmacy will continue to follow and monitor vancomycin.      Patient brief summary:  Narcisa Perales is a 83 y.o. female initiated on antimicrobial therapy with IV Vancomycin for treatment of suspected bacteremia    Drug Allergies:   Review of patient's allergies indicates:   Allergen Reactions    [place] [serum base no.214 (bulk)]     Sulfa (sulfonamide antibiotics)        Actual Body Weight:   77.1 kg    Renal Function:   Estimated Creatinine Clearance: 64.2 mL/min (based on SCr of 0.63 mg/dL).,     Dialysis Method (if applicable):  N/A    CBC (last 72 hours):  Recent Labs   Lab Result Units 11/10/23  1712 11/11/23  0400   WBC x10(3)/mcL 6.73 4.66   Hgb g/dL 14.0 12.6   Hct % 42.3 38.7   Platelet x10(3)/mcL 210 172   Mono % % 8.0 13.1   Eos % % 0.1 0.2   Basophil % % 0.4 0.2       Metabolic Panel (last 72 hours):  Recent Labs   Lab Result Units 11/10/23  1712 11/10/23  2133 11/11/23  0400   Sodium Level mmol/L 136  --  135*   Potassium Level mmol/L 4.3  --  3.3*   Chloride mmol/L 101  --  99   Carbon Dioxide mmol/L 26  --  29   Glucose Level mg/dL 128*  --  87   Glucose, UA   --  Normal  --    Blood Urea Nitrogen mg/dL 10.2  --  9.4*   Creatinine mg/dL 0.70  --  0.63   Albumin Level g/dL 3.7  --  3.2*   Bilirubin Total mg/dL 0.8  --  0.5   Alkaline Phosphatase unit/L 75  --  65   Aspartate Aminotransferase unit/L 19  --  16   Alanine Aminotransferase unit/L 16  --  14   Magnesium Level mg/dL 1.60  --   --        Drug levels (last 3 results):  No results for input(s): "VANCOMYCINRA", "VANCORANDOM", "VANCOMYCINPE", "VANCOPEAK", "VANCOMYCINTR", "VANCOTROUGH" in the last 72 hours.    Microbiologic " Results:  Microbiology Results (last 7 days)       Procedure Component Value Units Date/Time    BCID2 Panel [[id number]]  (Abnormal) Collected: 11/10/23 1712    Order Status: Completed Specimen: Blood Updated: 11/11/23 1340     CTX-M (ESBL ) N/A     IMP (Cabapenemase ) N/A     KPC resistance gene (Carbapenemase ) N/A     mcr-1 N/A     mecA ID N/A     Comment: Note: Antimicrobial resistance can occur via multiple mechanisms. A Not Detected result for antimicrobial resistance gene(s) does not indicate antimicrobial susceptibility. Subculturing is required for species identification and susceptibility testing of   isolates.        mecA/C and MREJ (MRSA) gene N/A     NDM (Carbapenemase ) N/A     OXA-48-like (Carbapenemase ) N/A     Denisse/B (VRE gene) N/A     VIM (Carbapenemase ) N/A     Enterococcus faecalis Not Detected     Enterococcus faecium Not Detected     Listeria monocytogenes Not Detected     Staphylococcus spp. Detected     Staphylococcus aureus Not Detected     Staphylococcus epidermidis Not Detected     Staphylococcus lugdunensis Not Detected     Streptococcus spp. Not Detected     Streptococcus agalactiae (Group B) Not Detected     Streptococcus pneumoniae Not Detected     Streptococcus pyogenes (Group A) Not Detected     Acinetobacter calcoaceticus/baumannii complex Not Detected     Bacteroides fragilis Not Detected     Enterobacterales Not Detected     Enterobacter cloacae complex Not Detected     Escherichia coli Not Detected     Klebsiella aerogenes Not Detected     Klebsiella oxytoca Not Detected     Klebsiella pneumoniae group Not Detected     Proteus spp. Not Detected     Salmonella spp. Not Detected     Serratia marcescens Not Detected     Haemophilus influenzae Not Detected     Neisseria meningitidis Not Detected     Pseudomonas aeruginosa Not Detected     Stenotrophomonas maltophilia Not Detected     Candida albicans Not Detected     Candida auris Not  Detected     Candida glabrata Not Detected     Candida krusei Not Detected     Candida parapsilosis Not Detected     Candida tropicalis Not Detected     Cryptococcus neoformans/gattii Not Detected    Narrative:      The [employer] BCID2 Panel is a multiplexed nucleic acid test intended for the use with [employer]® 2.0 or [employer]® [employer] for the simultaneous qualitative detection and identification of multiple bacterial and yeast nucleic acids and select genetic determinants associated with antimicrobial resistance.  The [employer] BCID2 Panel test is performed directly on blood culture samples identified as positive by a continuous monitoring blood culture system.  Results are intended to be interpreted in conjunction with Gram stain results.    Blood culture x two cultures. Draw prior to antibiotics. [[id number]]  (Abnormal) Collected: 11/10/23 1712    Order Status: Completed Specimen: Blood Updated: 11/11/23 1229     GRAM STAIN Gram Positive Cocci, probable Staphylococcus      Seen in gram stain of broth only      1 of 2 Aerobic bottles positive    Respiratory Culture [[id number]]     Order Status: Sent Specimen: Sputum, Expectorated     Blood culture x two cultures. Draw prior to antibiotics. [[id number]] Collected: 11/10/23 1712    Order Status: Resulted Specimen: Blood Updated: 11/10/23 4023

## 2023-11-13 LAB
ANION GAP SERPL CALC-SCNC: 7 MEQ/L
AV INDEX (PROSTH): 0.69
AV MEAN GRADIENT: 6 MMHG
AV PEAK GRADIENT: 12 MMHG
AV VELOCITY RATIO: 0.63
BACTERIA BLD CULT: ABNORMAL
BASOPHILS # BLD AUTO: 0.01 X10(3)/MCL
BASOPHILS NFR BLD AUTO: 0.2 %
BSA FOR ECHO PROCEDURE: 1.81 M2
BUN SERPL-MCNC: 10 MG/DL (ref 9.8–20.1)
CALCIUM SERPL-MCNC: 8.5 MG/DL (ref 8.4–10.2)
CHLORIDE SERPL-SCNC: 95 MMOL/L (ref 98–107)
CO2 SERPL-SCNC: 34 MMOL/L (ref 23–31)
CREAT SERPL-MCNC: 0.65 MG/DL (ref 0.55–1.02)
CREAT/UREA NIT SERPL: 15
CV ECHO LV RWT: 0.51 CM
DOP CALC AO PEAK VEL: 1.75 M/S
DOP CALC AO VTI: 35.4 CM
DOP CALC LVOT PEAK VEL: 1.11 M/S
DOP CALCLVOT PEAK VEL VTI: 24.5 CM
E WAVE DECELERATION TIME: 246 MSEC
E/A RATIO: 0.76
E/E' RATIO: 10.43 M/S
ECHO LV POSTERIOR WALL: 1.15 CM (ref 0.6–1.1)
EOSINOPHIL # BLD AUTO: 0.07 X10(3)/MCL (ref 0–0.9)
EOSINOPHIL NFR BLD AUTO: 1.4 %
ERYTHROCYTE [DISTWIDTH] IN BLOOD BY AUTOMATED COUNT: 12.9 % (ref 11.5–17)
FRACTIONAL SHORTENING: 30 % (ref 28–44)
GFR SERPLBLD CREATININE-BSD FMLA CKD-EPI: >60 MLS/MIN/1.73/M2
GLUCOSE SERPL-MCNC: 113 MG/DL (ref 82–115)
GRAM STN SPEC: ABNORMAL
HCT VFR BLD AUTO: 37.4 % (ref 37–47)
HGB BLD-MCNC: 12.3 G/DL (ref 12–16)
IMM GRANULOCYTES # BLD AUTO: 0.01 X10(3)/MCL (ref 0–0.04)
IMM GRANULOCYTES NFR BLD AUTO: 0.2 %
INTERVENTRICULAR SEPTUM: 1.05 CM (ref 0.6–1.1)
LEFT ATRIUM SIZE: 3.9 CM
LEFT ATRIUM VOLUME INDEX MOD: 27.5 ML/M2
LEFT ATRIUM VOLUME MOD: 47.9 CM3
LEFT INTERNAL DIMENSION IN SYSTOLE: 3.13 CM (ref 2.1–4)
LEFT VENTRICLE DIASTOLIC VOLUME INDEX: 53.1 ML/M2
LEFT VENTRICLE DIASTOLIC VOLUME: 92.4 ML
LEFT VENTRICLE MASS INDEX: 101 G/M2
LEFT VENTRICLE SYSTOLIC VOLUME INDEX: 22.3 ML/M2
LEFT VENTRICLE SYSTOLIC VOLUME: 38.8 ML
LEFT VENTRICULAR INTERNAL DIMENSION IN DIASTOLE: 4.5 CM (ref 3.5–6)
LEFT VENTRICULAR MASS: 175.02 G
LV LATERAL E/E' RATIO: 9.13 M/S
LV SEPTAL E/E' RATIO: 12.17 M/S
LVOT MG: 3 MMHG
LVOT MV: 0.74 CM/S
LYMPHOCYTES # BLD AUTO: 1.05 X10(3)/MCL (ref 0.6–4.6)
LYMPHOCYTES NFR BLD AUTO: 21.4 %
MCH RBC QN AUTO: 28.4 PG (ref 27–31)
MCHC RBC AUTO-ENTMCNC: 32.9 G/DL (ref 33–36)
MCV RBC AUTO: 86.4 FL (ref 80–94)
MONOCYTES # BLD AUTO: 0.62 X10(3)/MCL (ref 0.1–1.3)
MONOCYTES NFR BLD AUTO: 12.7 %
MV PEAK A VEL: 0.96 M/S
MV PEAK E VEL: 0.73 M/S
NEUTROPHILS # BLD AUTO: 3.14 X10(3)/MCL (ref 2.1–9.2)
NEUTROPHILS NFR BLD AUTO: 64.1 %
NRBC BLD AUTO-RTO: 0 %
OHS LV EJECTION FRACTION SIMPSONS BIPLANE MOD: 56 %
PLATELET # BLD AUTO: 174 X10(3)/MCL (ref 130–400)
PMV BLD AUTO: 9.3 FL (ref 7.4–10.4)
POCT GLUCOSE: 118 MG/DL (ref 70–110)
POCT GLUCOSE: 121 MG/DL (ref 70–110)
POCT GLUCOSE: 159 MG/DL (ref 70–110)
POTASSIUM SERPL-SCNC: 3.3 MMOL/L (ref 3.5–5.1)
PV PEAK GRADIENT: 4 MMHG
PV PEAK VELOCITY: 1.03 M/S
RBC # BLD AUTO: 4.33 X10(6)/MCL (ref 4.2–5.4)
SODIUM SERPL-SCNC: 136 MMOL/L (ref 136–145)
TDI LATERAL: 0.08 M/S
TDI SEPTAL: 0.06 M/S
TDI: 0.07 M/S
TRICUSPID ANNULAR PLANE SYSTOLIC EXCURSION: 1.48 CM
VANCOMYCIN TROUGH SERPL-MCNC: 17.7 UG/ML (ref 15–20)
WBC # SPEC AUTO: 4.9 X10(3)/MCL (ref 4.5–11.5)
Z-SCORE OF LEFT VENTRICULAR DIMENSION IN END DIASTOLE: -0.68
Z-SCORE OF LEFT VENTRICULAR DIMENSION IN END SYSTOLE: 0.39

## 2023-11-13 PROCEDURE — 97166 OT EVAL MOD COMPLEX 45 MIN: CPT

## 2023-11-13 PROCEDURE — 80202 ASSAY OF VANCOMYCIN: CPT | Performed by: INTERNAL MEDICINE

## 2023-11-13 PROCEDURE — A4216 STERILE WATER/SALINE, 10 ML: HCPCS | Performed by: INTERNAL MEDICINE

## 2023-11-13 PROCEDURE — 63600175 PHARM REV CODE 636 W HCPCS: Performed by: INTERNAL MEDICINE

## 2023-11-13 PROCEDURE — 97116 GAIT TRAINING THERAPY: CPT | Mod: CQ

## 2023-11-13 PROCEDURE — 25000003 PHARM REV CODE 250: Performed by: STUDENT IN AN ORGANIZED HEALTH CARE EDUCATION/TRAINING PROGRAM

## 2023-11-13 PROCEDURE — 21400001 HC TELEMETRY ROOM

## 2023-11-13 PROCEDURE — 85025 COMPLETE CBC W/AUTO DIFF WBC: CPT | Performed by: INTERNAL MEDICINE

## 2023-11-13 PROCEDURE — 87040 BLOOD CULTURE FOR BACTERIA: CPT | Performed by: STUDENT IN AN ORGANIZED HEALTH CARE EDUCATION/TRAINING PROGRAM

## 2023-11-13 PROCEDURE — 99900035 HC TECH TIME PER 15 MIN (STAT)

## 2023-11-13 PROCEDURE — 25000003 PHARM REV CODE 250: Performed by: INTERNAL MEDICINE

## 2023-11-13 PROCEDURE — 25000003 PHARM REV CODE 250: Performed by: NURSE PRACTITIONER

## 2023-11-13 PROCEDURE — 80048 BASIC METABOLIC PNL TOTAL CA: CPT | Performed by: INTERNAL MEDICINE

## 2023-11-13 PROCEDURE — 27000221 HC OXYGEN, UP TO 24 HOURS

## 2023-11-13 PROCEDURE — 94640 AIRWAY INHALATION TREATMENT: CPT

## 2023-11-13 PROCEDURE — 25000242 PHARM REV CODE 250 ALT 637 W/ HCPCS: Performed by: INTERNAL MEDICINE

## 2023-11-13 RX ORDER — DOCUSATE SODIUM 100 MG/1
100 CAPSULE, LIQUID FILLED ORAL 2 TIMES DAILY
Status: DISCONTINUED | OUTPATIENT
Start: 2023-11-14 | End: 2023-11-21 | Stop reason: HOSPADM

## 2023-11-13 RX ORDER — PRAVASTATIN SODIUM 40 MG/1
40 TABLET ORAL NIGHTLY
Status: DISCONTINUED | OUTPATIENT
Start: 2023-11-14 | End: 2023-11-21 | Stop reason: HOSPADM

## 2023-11-13 RX ORDER — GUAIFENESIN 100 MG/5ML
200 SOLUTION ORAL EVERY 4 HOURS PRN
Status: DISCONTINUED | OUTPATIENT
Start: 2023-11-13 | End: 2023-11-16

## 2023-11-13 RX ORDER — SODIUM CHLORIDE 9 MG/ML
INJECTION, SOLUTION INTRAVENOUS
Status: DISCONTINUED | OUTPATIENT
Start: 2023-11-13 | End: 2023-11-17

## 2023-11-13 RX ORDER — DOCUSATE SODIUM 100 MG/1
100 CAPSULE, LIQUID FILLED ORAL DAILY
Status: DISCONTINUED | OUTPATIENT
Start: 2023-11-13 | End: 2023-11-13

## 2023-11-13 RX ORDER — AMLODIPINE BESYLATE 5 MG/1
5 TABLET ORAL NIGHTLY
Status: DISCONTINUED | OUTPATIENT
Start: 2023-11-14 | End: 2023-11-21 | Stop reason: HOSPADM

## 2023-11-13 RX ORDER — SODIUM CHLORIDE 9 MG/ML
INJECTION, SOLUTION INTRAVENOUS
Status: DISCONTINUED | OUTPATIENT
Start: 2023-11-13 | End: 2023-11-21 | Stop reason: HOSPADM

## 2023-11-13 RX ADMIN — BENZONATATE 100 MG: 100 CAPSULE ORAL at 08:11

## 2023-11-13 RX ADMIN — CLONAZEPAM 1 MG: 1 TABLET ORAL at 09:11

## 2023-11-13 RX ADMIN — Medication 10 ML: at 09:11

## 2023-11-13 RX ADMIN — GUAIFENESIN 200 MG: 200 SOLUTION ORAL at 09:11

## 2023-11-13 RX ADMIN — FUROSEMIDE 40 MG: 40 TABLET ORAL at 09:11

## 2023-11-13 RX ADMIN — ACETAMINOPHEN 650 MG: 325 TABLET, FILM COATED ORAL at 02:11

## 2023-11-13 RX ADMIN — DOCUSATE SODIUM 100 MG: 100 CAPSULE, LIQUID FILLED ORAL at 09:11

## 2023-11-13 RX ADMIN — FUROSEMIDE 40 MG: 40 TABLET ORAL at 07:11

## 2023-11-13 RX ADMIN — GUAIFENESIN 200 MG: 200 SOLUTION ORAL at 08:11

## 2023-11-13 RX ADMIN — AMLODIPINE BESYLATE 5 MG: 5 TABLET ORAL at 09:11

## 2023-11-13 RX ADMIN — POLYETHYLENE GLYCOL 3350 17 G: 17 POWDER, FOR SOLUTION ORAL at 09:11

## 2023-11-13 RX ADMIN — LEVOTHYROXINE SODIUM 50 MCG: 50 TABLET ORAL at 05:11

## 2023-11-13 RX ADMIN — LOSARTAN POTASSIUM 100 MG: 50 TABLET, FILM COATED ORAL at 07:11

## 2023-11-13 RX ADMIN — BUDESONIDE INHALATION 0.25 MG: 0.5 SUSPENSION RESPIRATORY (INHALATION) at 09:11

## 2023-11-13 RX ADMIN — IPRATROPIUM BROMIDE AND ALBUTEROL SULFATE 3 ML: 2.5; .5 SOLUTION RESPIRATORY (INHALATION) at 09:11

## 2023-11-13 RX ADMIN — ASPIRIN 81 MG: 81 TABLET, COATED ORAL at 09:11

## 2023-11-13 RX ADMIN — POTASSIUM BICARBONATE 20 MEQ: 391 TABLET, EFFERVESCENT ORAL at 09:11

## 2023-11-13 RX ADMIN — PRAVASTATIN SODIUM 40 MG: 40 TABLET ORAL at 09:11

## 2023-11-13 RX ADMIN — TRAZODONE HYDROCHLORIDE 200 MG: 100 TABLET ORAL at 08:11

## 2023-11-13 RX ADMIN — GUAIFENESIN 200 MG: 200 SOLUTION ORAL at 04:11

## 2023-11-13 RX ADMIN — VANCOMYCIN HYDROCHLORIDE 1000 MG: 1 INJECTION, POWDER, LYOPHILIZED, FOR SOLUTION INTRAVENOUS at 09:11

## 2023-11-13 RX ADMIN — CITALOPRAM HYDROBROMIDE 40 MG: 20 TABLET ORAL at 07:11

## 2023-11-13 RX ADMIN — ENOXAPARIN SODIUM 40 MG: 40 INJECTION SUBCUTANEOUS at 08:11

## 2023-11-13 RX ADMIN — METOPROLOL TARTRATE 100 MG: 50 TABLET, FILM COATED ORAL at 09:11

## 2023-11-13 NOTE — PLAN OF CARE
11/13/23 1246   Discharge Assessment   Assessment Type Discharge Planning Assessment   Confirmed/corrected address, phone number and insurance Yes   Confirmed Demographics Correct on Facesheet   Source of Information patient   When was your last doctors appointment? 10/16/23   Communicated SAM with patient/caregiver Date not available/Unable to determine   Reason For Admission Pnemonia   People in Home alone   Prior to hospitilization cognitive status: Alert/Oriented   Current cognitive status: Alert/Oriented   Dressing/Bathing bathing difficulty, requires equipment   Dressing/Bathing Management Use shower chair   Equipment Currently Used at Home shower chair;walker, rolling   Do you currently have service(s) that help you manage your care at home? No   Do you take prescription medications? Yes   Do you have prescription coverage? Yes   Coverage [employer]   Do you have any problems affording any of your prescribed medications? No   Is the patient taking medications as prescribed? yes   Who is going to help you get home at discharge? Daughter Debra or son   How do you get to doctors appointments? car, drives self   Are you on dialysis? No   Do you take coumadin? No   DME Needed Upon Discharge  rollator   Discharge Plan discussed with: Patient;Adult children   Transition of Care Barriers None   Discharge Plan A Home Health   Discharge Plan B Home Health

## 2023-11-13 NOTE — PROGRESS NOTES
[facility]   progress note      Patient Name: Narcisa Perales  MRN: [id number]  Patient Class: IP- Inpatient   Admission Date: 11/10/2023  4:45 PM  Length of Stay: 3  Admitting Service: Hospital Medicine   Attending Physician: Sudarshan Rivera MD   Primary Care Provider: Unique Yancey FNP  History source: EMR, patient and/or patient's family    CHIEF COMPLAINT   Shortness of Breath (Pt to ED with c/o SOB, picked up from urgent care where she was dx with pneumonia. O2 sats 88% RA)    HISTORY OF PRESENT ILLNESS:   Patient is an 83-year-old female with past medical history as below who has had progressive dyspnea over the last several days.  She denied fever or purulent sputum production.  She went to an urgent care today where she was found to be satting 88% on room air and was referred to the ER for further evaluation.  Here she arrived satting in the mid 90s on 2 L nasal cannula but afebrile and hemodynamically stable.  Laboratory work was unremarkable.  CT of the chest however showed left lower lobe and lingula and right middle lobe areas of infiltrate consistent with multifocal pneumonia.  Blood cultures were obtained and she was started on broad-spectrum antibiotics admitted to the hospitalist service for further management.  Was put on OxyMask and was weaned off currently on nasal cannula.  Blood cultures 2/2 positive for Staphylococcus hormone knee.  Susceptible to vancomycin.  Respiratory culture and urine cultures have been negative.    Interval history   Patient seen and examined by chair.  Daughter and son in room.  Currently on 4 L nasal cannula p.r.n. however notes improvement in breathing.  Advised to do incentive spirometry frequently.  Patient's family enquiring about home health.  We will discuss with .  Also would like walker for home      PAST MEDICAL HISTORY:   CAD/stents   Essential hypertension   Type 2 diabetes mellitus  Hypothyroidism, on  Synthroid    PAST SURGICAL HISTORY:     Past Surgical History:   Procedure Laterality Date    CARPAL TUNNEL RELEASE      HYSTERECTOMY      JOINT REPLACEMENT      left TKA    rotator cuff Left     TONSILLECTOMY         ALLERGIES:   [place] [serum base no.214 (bulk)] and Sulfa (sulfonamide antibiotics)    FAMILY HISTORY:   Reviewed and non-contributory     SOCIAL HISTORY:     Social History     Tobacco Use    Smoking status: Former    Smokeless tobacco: Never   Substance Use Topics    Alcohol use: Not on file        HOME MEDICATIONS:     Prior to Admission medications    Medication Sig Start Date End Date Taking? Authorizing Provider   amLODIPine (NORVASC) 5 MG tablet amlodipine 5 mg tablet 10/7/21   Provider, Historical   citalopram (CELEXA) 40 MG tablet Take 40 mg by mouth every morning. 22   Provider, Historical   furosemide (LASIX) 40 MG tablet Take 40 mg by mouth every morning. 22   Provider, Historical   glipiZIDE (GLUCOTROL) 5 MG TR24 Take 5 mg by mouth every morning. 22   Provider, Historical   ketoconazole (NIZORAL) 2 % shampoo Apply 1 application topically every other day. 22   Provider, Historical   losartan (COZAAR) 100 MG tablet Take 100 mg by mouth every morning. 22   Provider, Historical   metoprolol tartrate (LOPRESSOR) 100 MG tablet SMARTSI Tablet(s) By Mouth Morning-Night 22   Provider, Historical   traZODone (DESYREL) 100 MG tablet Take 200 mg by mouth nightly. 22   Provider, Historical       REVIEW OF SYSTEMS:   Except as documented, all other systems reviewed and negative     PHYSICAL EXAM:   T 98 °F (36.7 °C)   /67   P 73   RR 18   O2 (!) 92 %  GENERAL: awake, alert, oriented and in no acute distress, non-toxic appearing   HEENT: normocephalic atraumatic   NECK: supple   LUNGS:  Clear to auscultation bilaterally; on 4L of O2  CVS: Regular rate and rhythm, normal peripheral perfusion  ABD: Soft, non-tender, non-distended, bowel sounds present  EXTREMITIES: no  "clubbing or cyanosis  SKIN: Warm, dry.   NEURO: alert and oriented, grossly without focal deficits   PSYCHIATRIC: Cooperative    LABS AND IMAGING:     Recent Labs     11/12/23 0722 11/13/23  0330   WBC 5.44 4.90   RBC 4.21 4.33   HGB 12.3 12.3   HCT 37.4 37.4   MCV 88.8 86.4   MCH 29.2 28.4   MCHC 32.9* 32.9*   RDW 13.3 12.9    174       Recent Labs     11/10/23  1712   LACTIC 0.7       No results for input(s): "INR", "APTT", "D-DIMER" in the last 72 hours.  No results for input(s): "HGBA1C", "CHOL", "TRIG", "LDL", "VLDL", "HDL" in the last 72 hours.   Recent Labs     11/10/23  1712 11/11/23  0400 11/12/23 0722 11/13/23  0330    135* 135* 136   K 4.3 3.3* 3.6 3.3*   CHLORIDE 101 99 97* 95*   CO2 26 29 30 34*   BUN 10.2 9.4* 9.9 10.0   CREATININE 0.70 0.63 0.68 0.65   GLUCOSE 128* 87 139* 113   CALCIUM 8.7 8.0* 8.1* 8.5   MG 1.60  --   --   --    ALBUMIN 3.7 3.2*  --   --    GLOBULIN 2.4 2.1*  --   --    ALKPHOS 75 65  --   --    ALT 16 14  --   --    AST 19 16  --   --    BILITOT 0.8 0.5  --   --        Recent Labs     11/10/23  1712   BNP 53.4   TROPONINI 0.012            CT Chest With Contrast  Narrative: EXAMINATION:  CT CHEST WITH CONTRAST    CLINICAL HISTORY:  Cough, persistent;Sepsis;    TECHNIQUE:  Low dose axial images, sagittal and coronal reformations were obtained from the thoracic inlet to the lung bases. Contrast was  administered.  Automatic exposure control is utilized to reduce patient radiation exposure.    COMPARISON:  None.    FINDINGS:  The lungs are adequately aerated.  No infiltrate is seen.  No mass is seen.  No lesion is seen.  No pleural effusion is seen.  No pleural thickening is seen.  The mediastinum appears grossly unremarkable.  No abnormal lymphadenopathy is seen.  No enhancing lesion is seen.  Thoracic aorta appears grossly unremarkable.  Heart appears normal.    Visualized portions of the upper abdomen shows a cyst in the right kidney.  Impression: Mild patchy " infiltrate in the left lower lobe posterior basal aspect    Areas of atelectasis versus developing infiltrate in the lingula and right middle lobe    Follow-up is recommended    Electronically signed by: Cristopher Sevilla  Date:    11/10/2023  Time:    19:53  X-Ray Chest AP Portable  EXAMINATION  XR CHEST AP PORTABLE    CLINICAL HISTORY  Sepsis;    TECHNIQUE  A total of 1 frontal image(s) of the chest.    COMPARISON  23 November 2020    FINDINGS  Lines/tubes/devices: ECG leads overlie the imaged region.  Spinal cord stimulator leads are also again appreciated, similar positioning.    The cardiomediastinal silhouette and central pulmonary vasculature are unremarkable for utilized technique.  The trachea is midline.  There is similar curvilinear atelectasis versus scarring at the left midlung region.  There is no lobar consolidation, pleural effusion, or pneumothorax.    There is no acute osseous or extrathoracic abnormality.    IMPRESSION  No convincing acute radiographic abnormality.    Electronically signed by: Eris Calderón  Date:    11/10/2023  Time:    19:11      ASSESSMENT & PLAN:   Community-acquired pneumonia  Acute hypoxemic respiratory failure secondary to above   Bacteremia 2/2 secondary to 1    HX:  HTN, DM2, HLD, CAD/stents, hypothyroidism    -Wean off of nasal cannula.  Patient currently is on 4 L.  Wean as tolerated.  -pulmonary hygiene with incentive spirometry  -blood cultures shows 2/2 bottles Staphylococcus positive, respiratory culture unable to be a maintain due to patient not producing any sputum, urine culture negative  -discontinue IV Levaquin. Continue IV vancomycin.  Sensitivities supportive for vanc  -repeat blood cultures  -obtain echo  -nebs and oxygen wean as tolerated   -continue DuoNeb and Pulmicort  -continue PT/OT  -resume Synthroid, blood pressure medication, and anxiety home meds.  -Given 20 meq of Effer K for hypokalemia of 3.2  -Cr and other labs stable  -repeat labs in am    DVT  prophylaxis: lovenox  Code status: full

## 2023-11-13 NOTE — PROGRESS NOTES
Pharmacokinetic Assessment Follow Up: IV Vancomycin    Vancomycin serum concentration assessment(s):    The trough level was drawn correctly and can be used to guide therapy at this time. The measurement is within the desired definitive target range of 15 to 20 mcg/mL.    Vancomycin Regimen Plan:    Continue regimen to Vancomycin 1000 mg IV every 12 hours with next serum trough concentration measured at 0830 prior to 5th dose on 11/14.    Drug levels (last 3 results):  Recent Labs   Lab Result Units 11/13/23  0330   Vancomycin Trough ug/ml 17.7       Pharmacy will continue to follow and monitor vancomycin.    Please contact pharmacy at extension [phone] for questions regarding this assessment.    Thank you for the consult,   Jose Carlos Ferris       Patient brief summary:  Narcisa Perales is a 83 y.o. female initiated on antimicrobial therapy with IV Vancomycin for treatment of bacteremia    The patient's current regimen is 1000mg q12h.    Drug Allergies:   Review of patient's allergies indicates:   Allergen Reactions    [place] [serum base no.214 (bulk)]     Sulfa (sulfonamide antibiotics)        Actual Body Weight:   77.1kg    Renal Function:   Estimated Creatinine Clearance: 62.3 mL/min (based on SCr of 0.65 mg/dL).,     Dialysis Method (if applicable):  N/A    CBC (last 72 hours):  Recent Labs   Lab Result Units 11/10/23  1712 11/11/23  0400 11/12/23  0722 11/13/23  0330   WBC x10(3)/mcL 6.73 4.66 5.44 4.90   Hgb g/dL 14.0 12.6 12.3 12.3   Hct % 42.3 38.7 37.4 37.4   Platelet x10(3)/mcL 210 172 179 174   Mono % % 8.0 13.1 10.8 12.7   Eos % % 0.1 0.2 1.3 1.4   Basophil % % 0.4 0.2 0.2 0.2       Metabolic Panel (last 72 hours):  Recent Labs   Lab Result Units 11/10/23  1712 11/10/23  2133 11/11/23  0400 11/12/23 0722 11/13/23  0330   Sodium Level mmol/L 136  --  135* 135* 136   Potassium Level mmol/L 4.3  --  3.3* 3.6 3.3*   Chloride mmol/L 101  --  99 97* 95*   Carbon Dioxide mmol/L 26  --  29 30 34*   Glucose Level  mg/dL 128*  --  87 139* 113   Glucose, UA   --  Normal  --   --   --    Blood Urea Nitrogen mg/dL 10.2  --  9.4* 9.9 10.0   Creatinine mg/dL 0.70  --  0.63 0.68 0.65   Albumin Level g/dL 3.7  --  3.2*  --   --    Bilirubin Total mg/dL 0.8  --  0.5  --   --    Alkaline Phosphatase unit/L 75  --  65  --   --    Aspartate Aminotransferase unit/L 19  --  16  --   --    Alanine Aminotransferase unit/L 16  --  14  --   --    Magnesium Level mg/dL 1.60  --   --   --   --        Vancomycin Administrations:  vancomycin given in the last 96 hours                     vancomycin in dextrose 5 % 1 gram/250 mL IVPB 1,000 mg (mg) 1,000 mg New Bag 11/13/23 0934     1,000 mg New Bag 11/12/23 2100     1,000 mg New Bag  0809    vancomycin 2 g in dextrose 5 % 500 mL IVPB (mg) 2,000 mg New Bag 11/10/23 1930                    Microbiologic Results:  Microbiology Results (last 7 days)       Procedure Component Value Units Date/Time    Blood Culture [[id number]] Collected: 11/13/23 0837    Order Status: Resulted Specimen: Blood from Arm, Right Updated: 11/13/23 0855    Blood Culture [[id number]] Collected: 11/13/23 0837    Order Status: Resulted Specimen: Blood from Arm, Left Updated: 11/13/23 0855    Blood culture x two cultures. Draw prior to antibiotics. [[id number]]  (Abnormal)  (Susceptibility) Collected: 11/10/23 1712    Order Status: Completed Specimen: Blood Updated: 11/13/23 0651     CULTURE, BLOOD ([facility]) Staphylococcus hominis     GRAM STAIN Gram Positive Cocci, probable Staphylococcus      Seen in gram stain of broth only      2 of 2 bottles positive    Blood culture x two cultures. Draw prior to antibiotics. [[id number]]  (Normal) Collected: 11/10/23 1712    Order Status: Completed Specimen: Blood Updated: 11/12/23 2101     CULTURE, BLOOD ([facility]) No Growth At 48 Hours    BCID2 Panel [[id number]]  (Abnormal) Collected: 11/10/23 1712    Order Status: Completed Specimen: Blood Updated: 11/11/23 1340     CTX-M (ESBL ) N/A      IMP (Cabapenemase ) N/A     KPC resistance gene (Carbapenemase ) N/A     mcr-1 N/A     mecA ID N/A     Comment: Note: Antimicrobial resistance can occur via multiple mechanisms. A Not Detected result for antimicrobial resistance gene(s) does not indicate antimicrobial susceptibility. Subculturing is required for species identification and susceptibility testing of   isolates.        mecA/C and MREJ (MRSA) gene N/A     NDM (Carbapenemase ) N/A     OXA-48-like (Carbapenemase ) N/A     Denisse/B (VRE gene) N/A     VIM (Carbapenemase ) N/A     Enterococcus faecalis Not Detected     Enterococcus faecium Not Detected     Listeria monocytogenes Not Detected     Staphylococcus spp. Detected     Staphylococcus aureus Not Detected     Staphylococcus epidermidis Not Detected     Staphylococcus lugdunensis Not Detected     Streptococcus spp. Not Detected     Streptococcus agalactiae (Group B) Not Detected     Streptococcus pneumoniae Not Detected     Streptococcus pyogenes (Group A) Not Detected     Acinetobacter calcoaceticus/baumannii complex Not Detected     Bacteroides fragilis Not Detected     Enterobacterales Not Detected     Enterobacter cloacae complex Not Detected     Escherichia coli Not Detected     Klebsiella aerogenes Not Detected     Klebsiella oxytoca Not Detected     Klebsiella pneumoniae group Not Detected     Proteus spp. Not Detected     Salmonella spp. Not Detected     Serratia marcescens Not Detected     Haemophilus influenzae Not Detected     Neisseria meningitidis Not Detected     Pseudomonas aeruginosa Not Detected     Stenotrophomonas maltophilia Not Detected     Candida albicans Not Detected     Candida auris Not Detected     Candida glabrata Not Detected     Candida krusei Not Detected     Candida parapsilosis Not Detected     Candida tropicalis Not Detected     Cryptococcus neoformans/gattii Not Detected    Narrative:      The [place] BCID2 Panel is a multiplexed  nucleic acid test intended for the use with [employer]® [employer]® 2.0 or makexyz® Film[employer]® [employer]ot[employer] Systems for the simultaneous qualitative detection and identification of multiple bacterial and yeast nucleic acids and select genetic determinants associated with antimicrobial resistance.  The [employer] BCID2 Panel test is performed directly on blood culture samples identified as positive by a continuous monitoring blood culture system.  Results are intended to be interpreted in conjunction with Gram stain results.    Respiratory Culture [[id number]]     Order Status: Sent Specimen: Sputum, Expectorated

## 2023-11-13 NOTE — PLAN OF CARE
Spoke to patient about home health and equipment (rollator). Choice list provided and freedom of choice obtained. Referral sent to [facility] and [facility].

## 2023-11-13 NOTE — PT/OT/SLP PROGRESS
Physical Therapy Treatment    Patient Name:  Narcisa Perales   MRN:  [id number]    Recommendations:     Discharge therapy intensity: Low Intensity Therapy   Discharge Equipment Recommendations: walker, rolling  Barriers to discharge:     Assessment:     Narcisa Perales is a 83 y.o. female admitted with a medical diagnosis of Pneumonia of both lungs due to infectious organism.  She presents with the following impairments/functional limitations: weakness, impaired endurance, impaired functional mobility, impaired balance, decreased lower extremity function .    Rehab Prognosis: Good; patient would benefit from acute skilled PT services to address these deficits and reach maximum level of function.    Recent Surgery: * No surgery found *      Plan:     During this hospitalization, patient to be seen 5 x/week to address the identified rehab impairments via gait training, therapeutic activities, therapeutic exercises, neuromuscular re-education and progress toward the following goals:    Plan of Care Expires:  12/10/23    Subjective     Chief Complaint:   Patient/Family Comments/goals:   Pain/Comfort:         Objective:     Communicated with nursing prior to session.  Patient found up in chair with peripheral IV, pulse ox (continuous), telemetry, oxygen upon PT entry to room.     General Precautions: Standard, fall  Orthopedic Precautions:    Braces:    Respiratory Status: Nasal cannula, flow 4 L/min  Blood Pressure:   Skin Integrity:     Functional Mobility:  Transfers:     Sit to Stand:  contact guard assistance with rolling walker  Gait: 260' w/RW, CGA  VC to step into walker    Therapeutic Activities/Exercises:  Sit to stands, ~6 reps, 5 w/UE support, 1 w/no UE support  CGA    Patient left up in chair with all lines intact, call button in reach, and nurse notified.    GOALS:   Multidisciplinary Problems       Physical Therapy Goals          Problem: Physical Therapy    Goal Priority Disciplines Outcome Goal Variances  Interventions   Physical Therapy Goal     PT, PT/OT Ongoing, Progressing     Description: Goals to be met by: 12/10/23     Patient will increase functional independence with mobility by performin. Supine to sit with Stand-by Assistance  2. Sit to supine with Stand-by Assistance  3. Sit to stand transfer with Stand-by Assistance  4. Bed to chair transfer with Stand-by Assistance using Rolling Walker  5. Gait  x 150 feet with Stand-by Assistance using Rolling Walker.                          Time Tracking:     PT Received On: 23  PT Start Time: 111     PT Stop Time: 1139  PT Total Time (min): 20 min     Billable Minutes: Gait Training 20    Treatment Type: Treatment  PT/PTA: PTA     Number of PTA visits since last PT visit: 2023

## 2023-11-13 NOTE — PLAN OF CARE
Problem: Occupational Therapy  Goal: Occupational Therapy Goal  Description: Goals to be met by: in 1 month     Patient will increase functional independence with ADLs by performing:    UE Dressing with Modified [place].  LE Dressing with Modified [place].  Grooming while standing at sink with Modified [place].  Toileting from toilet with Modified [place] for hygiene and clothing management.   Toilet transfer to toilet with Modified [place].    Outcome: Ongoing, Progressing

## 2023-11-13 NOTE — PT/OT/SLP EVAL
Occupational Therapy  Evaluation    Name: Narcisa Perales  MRN: [id number]  Admitting Diagnosis: Shortness of breath  Recent Surgery: * No surgery found *      Recommendations:     Discharge therapy intensity: Low Intensity Therapy   Discharge Equipment Recommendations:  walker, rolling  Barriers to discharge:       Assessment:     Narcisa Perales is a 83 y.o. female with a medical diagnosis of pneumonia and acute hypoxemic respiratory failure.  She presents with the following performance deficits affecting function: weakness, impaired endurance, impaired functional mobility, gait instability, impaired balance, impaired self care skills.   Pt tolerated initial evaluation well. Pt with significant coughing spells with slight blood-tinged sputum, pt reporting much less than yesterday. RN made aware. Pt donned bilateral shoes independently while seated in chair. Pt ambulated to toilet using RW with CGA and completed toilet hygiene with SPV. Pt's family stating she has family that live all around her and can check in and assist her as needed.    Rehab Prognosis: Good; patient would benefit from acute skilled OT services to address these deficits and reach maximum level of function.       Plan:     Patient to be seen 3 x/week to address the above listed problems via self-care/home management, therapeutic activities, therapeutic exercises  Plan of Care Expires: 12/13/23  Plan of Care Reviewed with: patient    Subjective     Chief Complaint: continued cough  Patient/Family Comments/goals: to get stronger    Occupational Profile:  Living Environment: lives alone in [facility] with no steps to enter; walk in shower with chair  Previous level of function: Independent  Roles and Routines: drives, cares for self  Equipment Used at Home: shower chair  Assistance upon Discharge: family    Pain/Comfort:  Pain Rating 1: 0/10    Patients cultural, spiritual, Roman Catholic conflicts given the current situation: no    Objective:     OT  communicated with RN prior to session.      Patient was found up in chair with oxygen, peripheral IV, pulse ox (continuous), telemetry upon OT entry to room.    General Precautions: Standard, fall  Orthopedic Precautions: N/A  Braces: N/A    Vital Signs: Sp02: 94  Supplemental 02: 3.5 L via nasal canula      Functional Mobility/Transfers:  Patient completed Toilet Transfer Step Transfer technique with contact guard assistance with  rolling walker  Functional Mobility: no LOB, CGA using RW    Activities of Daily Living:  Lower Body Dressing: independence donning bilateral slip-on shoes  Toileting: supervision      Functional Cognition:  Orientation: oriented to Person, Place, Time, and Situation  Safety Awareness: WFL    Upper Extremity Function:  Right Upper Extremity:   Strength: 4/5    Left Upper Extremity:  Strength: 4/5    Balance:   Dynamic standing balance:CGA at RW    Therapeutic Positioning  Risk for acquired pressure injuries is decreased due to ability to get to BSC/toilet with assist.    OT interventions performed during the course of today's session:   Education was provided on benefits of and recommendations for therapeutic positioning    Skin assessment: all bony prominences were assessed    Findings: no redness or breakdown noted    OT recommendations for therapeutic positioning throughout hospitalization:   Follow [facility] Pressure Injury Prevention Protocol      Patient Education:  Patient and family were provided with verbal education education regarding OT role/goals/POC, fall prevention, and safety awareness.  Understanding was verbalized.     Patient left up in chair with all lines intact, call button in reach, and family present    GOALS:   Multidisciplinary Problems       Occupational Therapy Goals          Problem: Occupational Therapy    Goal Priority Disciplines Outcome Interventions   Occupational Therapy Goal     OT, PT/OT Ongoing, Progressing    Description: Goals to be met by: in 1 month      Patient will increase functional independence with ADLs by performing:    UE Dressing with Modified [place].  LE Dressing with Modified [place].  Grooming while standing at sink with Modified [place].  Toileting from toilet with Modified [place] for hygiene and clothing management.   Toilet transfer to toilet with Modified [place].                         History:     Past Medical History:   Diagnosis Date    Carpal tunnel syndrome     Hypertension          Past Surgical History:   Procedure Laterality Date    CARPAL TUNNEL RELEASE      HYSTERECTOMY      JOINT REPLACEMENT      left TKA    rotator cuff Left     TONSILLECTOMY         Time Tracking:     OT Date of Treatment: 11/13/23  OT Start Time: 1040  OT Stop Time: 1103  OT Total Time (min): 23 min    Billable Minutes:Evaluation Moderate complexity    11/13/2023

## 2023-11-14 LAB
ANION GAP SERPL CALC-SCNC: 12 MEQ/L
BUN SERPL-MCNC: 15.5 MG/DL (ref 9.8–20.1)
CALCIUM SERPL-MCNC: 8.8 MG/DL (ref 8.4–10.2)
CHLORIDE SERPL-SCNC: 93 MMOL/L (ref 98–107)
CO2 SERPL-SCNC: 30 MMOL/L (ref 23–31)
CREAT SERPL-MCNC: 0.81 MG/DL (ref 0.55–1.02)
CREAT/UREA NIT SERPL: 19
GFR SERPLBLD CREATININE-BSD FMLA CKD-EPI: >60 MLS/MIN/1.73/M2
GLUCOSE SERPL-MCNC: 140 MG/DL (ref 82–115)
MAGNESIUM SERPL-MCNC: 1.7 MG/DL (ref 1.6–2.6)
POCT GLUCOSE: 171 MG/DL (ref 70–110)
POCT GLUCOSE: 85 MG/DL (ref 70–110)
POTASSIUM SERPL-SCNC: 3.7 MMOL/L (ref 3.5–5.1)
SODIUM SERPL-SCNC: 135 MMOL/L (ref 136–145)
VANCOMYCIN TROUGH SERPL-MCNC: 19.1 UG/ML (ref 15–20)

## 2023-11-14 PROCEDURE — 80202 ASSAY OF VANCOMYCIN: CPT | Performed by: INTERNAL MEDICINE

## 2023-11-14 PROCEDURE — 97116 GAIT TRAINING THERAPY: CPT | Mod: CQ

## 2023-11-14 PROCEDURE — 25000003 PHARM REV CODE 250: Performed by: INTERNAL MEDICINE

## 2023-11-14 PROCEDURE — 21400001 HC TELEMETRY ROOM

## 2023-11-14 PROCEDURE — 99900035 HC TECH TIME PER 15 MIN (STAT)

## 2023-11-14 PROCEDURE — 11000001 HC ACUTE MED/SURG PRIVATE ROOM

## 2023-11-14 PROCEDURE — 80048 BASIC METABOLIC PNL TOTAL CA: CPT | Performed by: STUDENT IN AN ORGANIZED HEALTH CARE EDUCATION/TRAINING PROGRAM

## 2023-11-14 PROCEDURE — 25000242 PHARM REV CODE 250 ALT 637 W/ HCPCS: Performed by: INTERNAL MEDICINE

## 2023-11-14 PROCEDURE — 83735 ASSAY OF MAGNESIUM: CPT | Performed by: STUDENT IN AN ORGANIZED HEALTH CARE EDUCATION/TRAINING PROGRAM

## 2023-11-14 PROCEDURE — 94640 AIRWAY INHALATION TREATMENT: CPT

## 2023-11-14 PROCEDURE — 63600175 PHARM REV CODE 636 W HCPCS: Performed by: INTERNAL MEDICINE

## 2023-11-14 PROCEDURE — 27000221 HC OXYGEN, UP TO 24 HOURS

## 2023-11-14 PROCEDURE — 97530 THERAPEUTIC ACTIVITIES: CPT | Mod: CQ

## 2023-11-14 RX ORDER — GUAIFENESIN 600 MG/1
600 TABLET, EXTENDED RELEASE ORAL 2 TIMES DAILY
Status: DISCONTINUED | OUTPATIENT
Start: 2023-11-14 | End: 2023-11-21 | Stop reason: HOSPADM

## 2023-11-14 RX ADMIN — VANCOMYCIN HYDROCHLORIDE 1000 MG: 1 INJECTION, POWDER, LYOPHILIZED, FOR SOLUTION INTRAVENOUS at 09:11

## 2023-11-14 RX ADMIN — GUAIFENESIN 600 MG: 600 TABLET, EXTENDED RELEASE ORAL at 09:11

## 2023-11-14 RX ADMIN — BUDESONIDE INHALATION 0.25 MG: 0.5 SUSPENSION RESPIRATORY (INHALATION) at 10:11

## 2023-11-14 RX ADMIN — LEVOTHYROXINE SODIUM 50 MCG: 50 TABLET ORAL at 06:11

## 2023-11-14 RX ADMIN — DOCUSATE SODIUM 100 MG: 100 CAPSULE, LIQUID FILLED ORAL at 09:11

## 2023-11-14 RX ADMIN — LOSARTAN POTASSIUM 100 MG: 50 TABLET, FILM COATED ORAL at 06:11

## 2023-11-14 RX ADMIN — ASPIRIN 81 MG: 81 TABLET, COATED ORAL at 09:11

## 2023-11-14 RX ADMIN — ENOXAPARIN SODIUM 40 MG: 40 INJECTION SUBCUTANEOUS at 09:11

## 2023-11-14 RX ADMIN — FUROSEMIDE 40 MG: 40 TABLET ORAL at 06:11

## 2023-11-14 RX ADMIN — ACETAMINOPHEN 650 MG: 325 TABLET, FILM COATED ORAL at 04:11

## 2023-11-14 RX ADMIN — TRAZODONE HYDROCHLORIDE 200 MG: 100 TABLET ORAL at 09:11

## 2023-11-14 RX ADMIN — Medication 6 MG: at 09:11

## 2023-11-14 RX ADMIN — AMLODIPINE BESYLATE 5 MG: 5 TABLET ORAL at 09:11

## 2023-11-14 RX ADMIN — GUAIFENESIN 600 MG: 600 TABLET, EXTENDED RELEASE ORAL at 03:11

## 2023-11-14 RX ADMIN — CLONAZEPAM 1 MG: 1 TABLET ORAL at 09:11

## 2023-11-14 RX ADMIN — BENZONATATE 100 MG: 100 CAPSULE ORAL at 09:11

## 2023-11-14 RX ADMIN — METOPROLOL TARTRATE 100 MG: 50 TABLET, FILM COATED ORAL at 09:11

## 2023-11-14 RX ADMIN — PRAVASTATIN SODIUM 40 MG: 40 TABLET ORAL at 09:11

## 2023-11-14 RX ADMIN — CITALOPRAM HYDROBROMIDE 40 MG: 20 TABLET ORAL at 06:11

## 2023-11-14 NOTE — PT/OT/SLP PROGRESS
Physical Therapy Treatment    Patient Name:  Narcisa Perales   MRN:  [id number]    Recommendations:     Discharge therapy intensity: Low Intensity Therapy   Discharge Equipment Recommendations: walker, rolling  Barriers to discharge: Ongoing medical needs    Assessment:     Narcisa Perales is a 83 y.o. female admitted with a medical diagnosis of Pneumonia of both lungs due to infectious organism.  She presents with the following impairments/functional limitations: weakness, impaired endurance, impaired functional mobility, impaired balance, decreased lower extremity function .    Rehab Prognosis: Good; patient would benefit from acute skilled PT services to address these deficits and reach maximum level of function.    Recent Surgery: * No surgery found *      Plan:     During this hospitalization, patient to be seen 5 x/week to address the identified rehab impairments via gait training, therapeutic activities, therapeutic exercises, neuromuscular re-education and progress toward the following goals:    Plan of Care Expires:  12/10/23    Subjective     Chief Complaint:   Patient/Family Comments/goals:   Pain/Comfort:         Objective:     Communicated with nursing prior to session.  Patient found sitting edge of bed with CNA with pulse ox (continuous), telemetry, peripheral IV, oxygen upon PT entry to room.     General Precautions: Standard, fall  Orthopedic Precautions:    Braces:    Respiratory Status: Nasal cannula, flow 4 L/min  Blood Pressure:   Skin Integrity:     Functional Mobility:  Transfers:     Sit to Stand:  contact guard assistance with no AD  Gait: 250' w/RW, SBA  Noted slow christina & flexed posture    Therapeutic Activities/Exercises:  Pt. Amb to bathroom for +void w/HHA, CGA  Pt. Amb into [place] with w/RW, SBA    Patient left up in chair with all lines intact, call button in reach, and nurse notified.    GOALS:   Multidisciplinary Problems       Physical Therapy Goals          Problem: Physical  Therapy    Goal Priority Disciplines Outcome Goal Variances Interventions   Physical Therapy Goal     PT, PT/OT Ongoing, Progressing     Description: Goals to be met by: 12/10/23     Patient will increase functional independence with mobility by performin. Supine to sit with Stand-by Assistance  2. Sit to supine with Stand-by Assistance  3. Sit to stand transfer with Stand-by Assistance  4. Bed to chair transfer with Stand-by Assistance using Rolling Walker  5. Gait  x 150 feet with Stand-by Assistance using Rolling Walker.                          Time Tracking:     PT Received On: 23  PT Start Time: 1120     PT Stop Time: 1148  PT Total Time (min): 28 min     Billable Minutes: Gait Training 15 and Therapeutic Activity 13    Treatment Type: Treatment  PT/PTA: PTA     Number of PTA visits since last PT visit: 2     2023

## 2023-11-14 NOTE — PROGRESS NOTES
Pharmacokinetic Assessment Follow Up: IV Vancomycin    Vancomycin serum concentration assessment(s):    The trough level was drawn correctly and can be used to guide therapy at this time. The measurement is within the desired definitive target range of 15 to 20 mcg/mL.    Vancomycin Regimen Plan:    Change regimen to Vancomycin 1500 mg IV every 24 hours with next serum trough concentration measured at 0100 prior to 3rd dose on 11/17. First dose on 11/15 at 02:00.    Drug levels (last 3 results):  Recent Labs   Lab Result Units 11/13/23 0330 11/14/23  0855   Vancomycin Trough ug/ml 17.7 19.1       Pharmacy will continue to follow and monitor vancomycin.    Please contact pharmacy at extension [phone] for questions regarding this assessment.    Thank you for the consult,   Jose Carlos Freris       Patient brief summary:  Narcisa Perales is a 83 y.o. female initiated on antimicrobial therapy with IV Vancomycin for treatment of bacteremia    The patient's current regimen is 1000mg q12h.    Drug Allergies:   Review of patient's allergies indicates:   Allergen Reactions    [place] [serum base no.214 (bulk)]     Sulfa (sulfonamide antibiotics)        Actual Body Weight:   77.1kg    Renal Function:   Estimated Creatinine Clearance: 50 mL/min (based on SCr of 0.81 mg/dL).,     Dialysis Method (if applicable):  N/A    CBC (last 72 hours):  Recent Labs   Lab Result Units 11/12/23 0722 11/13/23 0330   WBC x10(3)/mcL 5.44 4.90   Hgb g/dL 12.3 12.3   Hct % 37.4 37.4   Platelet x10(3)/mcL 179 174   Mono % % 10.8 12.7   Eos % % 1.3 1.4   Basophil % % 0.2 0.2       Metabolic Panel (last 72 hours):  Recent Labs   Lab Result Units 11/12/23  0722 11/13/23  0330 11/14/23  0410   Sodium Level mmol/L 135* 136 135*   Potassium Level mmol/L 3.6 3.3* 3.7   Chloride mmol/L 97* 95* 93*   Carbon Dioxide mmol/L 30 34* 30   Glucose Level mg/dL 139* 113 140*   Blood Urea Nitrogen mg/dL 9.9 10.0 15.5   Creatinine mg/dL 0.68 0.65 0.81   Magnesium Level  mg/dL  --   --  1.70       Vancomycin Administrations:  vancomycin given in the last 96 hours                     vancomycin in dextrose 5 % 1 gram/250 mL IVPB 1,000 mg (mg) 1,000 mg New Bag 11/14/23 0929     1,000 mg New Bag 11/13/23 2125     1,000 mg New Bag  0934      Restarted 11/12/23 2102     1,000 mg New Bag  2100     1,000 mg New Bag  0809    vancomycin 2 g in dextrose 5 % 500 mL IVPB (mg) 2,000 mg New Bag 11/10/23 1930                    Microbiologic Results:  Microbiology Results (last 7 days)       Procedure Component Value Units Date/Time    Blood culture x two cultures. Draw prior to antibiotics. [[id number]]  (Normal) Collected: 11/10/23 1712    Order Status: Completed Specimen: Blood Updated: 11/13/23 2100     CULTURE, BLOOD ([facility]) No Growth At 72 Hours    Blood Culture [[id number]] Collected: 11/13/23 0837    Order Status: Resulted Specimen: Blood from Arm, Right Updated: 11/13/23 0855    Blood Culture [[id number]] Collected: 11/13/23 0837    Order Status: Resulted Specimen: Blood from Arm, Left Updated: 11/13/23 0855    Blood culture x two cultures. Draw prior to antibiotics. [[id number]]  (Abnormal)  (Susceptibility) Collected: 11/10/23 1712    Order Status: Completed Specimen: Blood Updated: 11/13/23 0651     CULTURE, BLOOD ([facility]) Staphylococcus hominis     GRAM STAIN Gram Positive Cocci, probable Staphylococcus      Seen in gram stain of broth only      2 of 2 bottles positive    BCID2 Panel [[id number]]  (Abnormal) Collected: 11/10/23 1712    Order Status: Completed Specimen: Blood Updated: 11/11/23 1340     CTX-M (ESBL ) N/A     IMP (Cabapenemase ) N/A     KPC resistance gene (Carbapenemase ) N/A     mcr-1 N/A     mecA ID N/A     Comment: Note: Antimicrobial resistance can occur via multiple mechanisms. A Not Detected result for antimicrobial resistance gene(s) does not indicate antimicrobial susceptibility. Subculturing is required for species identification and  susceptibility testing of   isolates.        mecA/C and MREJ (MRSA) gene N/A     NDM (Carbapenemase ) N/A     OXA-48-like (Carbapenemase ) N/A     Denisse/B (VRE gene) N/A     VIM (Carbapenemase ) N/A     Enterococcus faecalis Not Detected     Enterococcus faecium Not Detected     Listeria monocytogenes Not Detected     Staphylococcus spp. Detected     Staphylococcus aureus Not Detected     Staphylococcus epidermidis Not Detected     Staphylococcus lugdunensis Not Detected     Streptococcus spp. Not Detected     Streptococcus agalactiae (Group B) Not Detected     Streptococcus pneumoniae Not Detected     Streptococcus pyogenes (Group A) Not Detected     Acinetobacter calcoaceticus/baumannii complex Not Detected     Bacteroides fragilis Not Detected     Enterobacterales Not Detected     Enterobacter cloacae complex Not Detected     Escherichia coli Not Detected     Klebsiella aerogenes Not Detected     Klebsiella oxytoca Not Detected     Klebsiella pneumoniae group Not Detected     Proteus spp. Not Detected     Salmonella spp. Not Detected     Serratia marcescens Not Detected     Haemophilus influenzae Not Detected     Neisseria meningitidis Not Detected     Pseudomonas aeruginosa Not Detected     Stenotrophomonas maltophilia Not Detected     Candida albicans Not Detected     Candida auris Not Detected     Candida glabrata Not Detected     Candida krusei Not Detected     Candida parapsilosis Not Detected     Candida tropicalis Not Detected     Cryptococcus neoformans/gattii Not Detected    Narrative:      The [employer] BCID2 Panel is a multiplexed nucleic acid test intended for the use with [employer]® 2.0 or [employer]® [employer] Systems for the simultaneous qualitative detection and identification of multiple bacterial and yeast nucleic acids and select genetic determinants associated with antimicrobial resistance.  The [employer] BCID2 Panel test is performed directly on blood culture  samples identified as positive by a continuous monitoring blood culture system.  Results are intended to be interpreted in conjunction with Gram stain results.    Respiratory Culture [[id number]]     Order Status: Sent Specimen: Sputum, Expectorated

## 2023-11-14 NOTE — PROGRESS NOTES
[facility]   progress note      Patient Name: Narcisa Perales  MRN: [id number]  Patient Class: IP- Inpatient   Admission Date: 11/10/2023  4:45 PM  Length of Stay: 4  Admitting Service: Hospital Medicine   Attending Physician: Sudarshan Rivera MD   Primary Care Provider: Unique Yancey FNP  History source: EMR, patient and/or patient's family    CHIEF COMPLAINT   Shortness of Breath (Pt to ED with c/o SOB, picked up from urgent care where she was dx with pneumonia. O2 sats 88% RA)    HISTORY OF PRESENT ILLNESS:   Patient is an 83-year-old female with past medical history as below who has had progressive dyspnea over the last several days.  She denied fever or purulent sputum production.  She went to an urgent care today where she was found to be satting 88% on room air and was referred to the ER for further evaluation.  Here she arrived satting in the mid 90s on 2 L nasal cannula but afebrile and hemodynamically stable.  Laboratory work was unremarkable.  CT of the chest however showed left lower lobe and lingula and right middle lobe areas of infiltrate consistent with multifocal pneumonia.  Blood cultures were obtained and she was started on broad-spectrum antibiotics admitted to the hospitalist service for further management.  Was put on OxyMask and was weaned off currently on nasal cannula.  Blood cultures 2/2 positive for Staphylococcus hormone knee.  Susceptible to vancomycin.  Respiratory culture and urine cultures have been negative.    Interval history     Patient seen and examined by chair.  Currently sleeping.  Continues to be on 4 L nasal cannula.  Improvement in shortness a breath however.  Advised to do incentive spirometry frequently.  Denies any chest pain.  No acute overnight concerns.  Blood culture still pending.    PAST MEDICAL HISTORY:   CAD/stents   Essential hypertension   Type 2 diabetes mellitus  Hypothyroidism, on Synthroid    PAST SURGICAL HISTORY:      Past Surgical History:   Procedure Laterality Date    CARPAL TUNNEL RELEASE      HYSTERECTOMY      JOINT REPLACEMENT      left TKA    rotator cuff Left     TONSILLECTOMY         ALLERGIES:   [place] [serum base no.214 (bulk)] and Sulfa (sulfonamide antibiotics)    FAMILY HISTORY:   Reviewed and non-contributory     SOCIAL HISTORY:     Social History     Tobacco Use    Smoking status: Former    Smokeless tobacco: Never   Substance Use Topics    Alcohol use: Not on file        HOME MEDICATIONS:     Prior to Admission medications    Medication Sig Start Date End Date Taking? Authorizing Provider   amLODIPine (NORVASC) 5 MG tablet amlodipine 5 mg tablet 10/7/21   Provider, Historical   citalopram (CELEXA) 40 MG tablet Take 40 mg by mouth every morning. 22   Provider, Historical   furosemide (LASIX) 40 MG tablet Take 40 mg by mouth every morning. 22   Provider, Historical   glipiZIDE (GLUCOTROL) 5 MG TR24 Take 5 mg by mouth every morning. 22   Provider, Historical   ketoconazole (NIZORAL) 2 % shampoo Apply 1 application topically every other day. 22   Provider, Historical   losartan (COZAAR) 100 MG tablet Take 100 mg by mouth every morning. 22   Provider, Historical   metoprolol tartrate (LOPRESSOR) 100 MG tablet SMARTSI Tablet(s) By Mouth Morning-Night 22   Provider, Historical   traZODone (DESYREL) 100 MG tablet Take 200 mg by mouth nightly. 22   Provider, Historical       REVIEW OF SYSTEMS:   Except as documented, all other systems reviewed and negative     PHYSICAL EXAM:   T 97.9 °F (36.6 °C)   BP (!) 153/88   P 79   RR 17   O2 (!) 94 %  GENERAL: awake, alert, oriented and in no acute distress, non-toxic appearing   HEENT: normocephalic atraumatic   NECK: supple   LUNGS:  Clear to auscultation bilaterally; on 4L of O2  CVS: Regular rate and rhythm, normal peripheral perfusion  ABD: Soft, non-tender, non-distended, bowel sounds present  EXTREMITIES: no clubbing or cyanosis  SKIN:  "Warm, dry.   NEURO: alert and oriented, grossly without focal deficits   PSYCHIATRIC: Cooperative    LABS AND IMAGING:     Recent Labs     11/12/23  0722 11/13/23  0330   WBC 5.44 4.90   RBC 4.21 4.33   HGB 12.3 12.3   HCT 37.4 37.4   MCV 88.8 86.4   MCH 29.2 28.4   MCHC 32.9* 32.9*   RDW 13.3 12.9    174       No results for input(s): "LACTIC" in the last 72 hours.    No results for input(s): "INR", "APTT", "D-DIMER" in the last 72 hours.  No results for input(s): "HGBA1C", "CHOL", "TRIG", "LDL", "VLDL", "HDL" in the last 72 hours.   Recent Labs     11/13/23  0330 11/14/23  0410    135*   K 3.3* 3.7   CHLORIDE 95* 93*   CO2 34* 30   BUN 10.0 15.5   CREATININE 0.65 0.81   GLUCOSE 113 140*   CALCIUM 8.5 8.8   MG  --  1.70       No results for input(s): "BNP", "CPK", "TROPONINI" in the last 72 hours.         Echo    Left Ventricle: The left ventricle is normal in size. Normal wall   thickness. Normal wall motion. There is normal systolic function with a   visually estimated ejection fraction of 55 - 60%. Grade I diastolic   dysfunction.    Right Ventricle: Normal right ventricular cavity size. Systolic   function is borderline low. TAPSE is 1.48 cm.    Left Atrium: Left atrium is mildly dilated.    Aortic Valve: There is mild aortic valve sclerosis. There is mild   aortic regurgitation.    Mitral Valve: Mildly thickened leaflets. There is mild regurgitation.    Tricuspid Valve: There is mild regurgitation.      ASSESSMENT & PLAN:   Community-acquired pneumonia  Acute hypoxemic respiratory failure secondary to above   Bacteremia 2/2 secondary to 1    HX:  HTN, DM2, HLD, CAD/stents, hypothyroidism    -Wean off of nasal cannula.  Patient currently is on 4 L.  Wean as tolerated.  -pulmonary hygiene with incentive spirometry  -blood cultures shows 2/2 bottles Staphylococcus positive, respiratory culture unable to be a maintain due to patient not producing any sputum, urine culture negative; echo done, negative " for any vegetations  -Continue IV vancomycin.  Sensitivities supportive for vanc  -repeat blood cultures pending  -nebs and oxygen wean as tolerated   -continue DuoNeb and Pulmicort  -continue PT/OT  -resume Synthroid, blood pressure medication, and anxiety home meds.  -Cr and other labs stable  -repeat labs in am    DVT prophylaxis: lovenox  Code status: full     Jennifer Tomlin DO  Department of Hospital Medicine  [facility]  11/14/2023

## 2023-11-15 LAB
ANION GAP SERPL CALC-SCNC: 6 MEQ/L
BACTERIA BLD CULT: NORMAL
BUN SERPL-MCNC: 14.3 MG/DL (ref 9.8–20.1)
CALCIUM SERPL-MCNC: 8.4 MG/DL (ref 8.4–10.2)
CHLORIDE SERPL-SCNC: 95 MMOL/L (ref 98–107)
CO2 SERPL-SCNC: 35 MMOL/L (ref 23–31)
CREAT SERPL-MCNC: 0.8 MG/DL (ref 0.55–1.02)
CREAT/UREA NIT SERPL: 18
ERYTHROCYTE [DISTWIDTH] IN BLOOD BY AUTOMATED COUNT: 13.2 % (ref 11.5–17)
GFR SERPLBLD CREATININE-BSD FMLA CKD-EPI: >60 MLS/MIN/1.73/M2
GLUCOSE SERPL-MCNC: 178 MG/DL (ref 82–115)
HCT VFR BLD AUTO: 36.5 % (ref 37–47)
HGB BLD-MCNC: 12 G/DL (ref 12–16)
MCH RBC QN AUTO: 28.2 PG (ref 27–31)
MCHC RBC AUTO-ENTMCNC: 32.9 G/DL (ref 33–36)
MCV RBC AUTO: 85.7 FL (ref 80–94)
NRBC BLD AUTO-RTO: 0 %
PLATELET # BLD AUTO: 198 X10(3)/MCL (ref 130–400)
PMV BLD AUTO: 9.1 FL (ref 7.4–10.4)
POCT GLUCOSE: 130 MG/DL (ref 70–110)
POCT GLUCOSE: 181 MG/DL (ref 70–110)
POTASSIUM SERPL-SCNC: 3.8 MMOL/L (ref 3.5–5.1)
RBC # BLD AUTO: 4.26 X10(6)/MCL (ref 4.2–5.4)
SODIUM SERPL-SCNC: 136 MMOL/L (ref 136–145)
WBC # SPEC AUTO: 5.05 X10(3)/MCL (ref 4.5–11.5)

## 2023-11-15 PROCEDURE — 63600175 PHARM REV CODE 636 W HCPCS: Performed by: STUDENT IN AN ORGANIZED HEALTH CARE EDUCATION/TRAINING PROGRAM

## 2023-11-15 PROCEDURE — 80048 BASIC METABOLIC PNL TOTAL CA: CPT | Performed by: STUDENT IN AN ORGANIZED HEALTH CARE EDUCATION/TRAINING PROGRAM

## 2023-11-15 PROCEDURE — 63600175 PHARM REV CODE 636 W HCPCS: Performed by: INTERNAL MEDICINE

## 2023-11-15 PROCEDURE — 85027 COMPLETE CBC AUTOMATED: CPT | Performed by: STUDENT IN AN ORGANIZED HEALTH CARE EDUCATION/TRAINING PROGRAM

## 2023-11-15 PROCEDURE — 21400001 HC TELEMETRY ROOM

## 2023-11-15 PROCEDURE — 27000221 HC OXYGEN, UP TO 24 HOURS

## 2023-11-15 PROCEDURE — 97116 GAIT TRAINING THERAPY: CPT | Mod: CQ

## 2023-11-15 PROCEDURE — 94640 AIRWAY INHALATION TREATMENT: CPT

## 2023-11-15 PROCEDURE — 97530 THERAPEUTIC ACTIVITIES: CPT | Mod: CQ

## 2023-11-15 PROCEDURE — 25000003 PHARM REV CODE 250: Performed by: INTERNAL MEDICINE

## 2023-11-15 PROCEDURE — 94761 N-INVAS EAR/PLS OXIMETRY MLT: CPT

## 2023-11-15 PROCEDURE — 25000242 PHARM REV CODE 250 ALT 637 W/ HCPCS: Performed by: INTERNAL MEDICINE

## 2023-11-15 PROCEDURE — 99900035 HC TECH TIME PER 15 MIN (STAT)

## 2023-11-15 PROCEDURE — 25000003 PHARM REV CODE 250: Performed by: STUDENT IN AN ORGANIZED HEALTH CARE EDUCATION/TRAINING PROGRAM

## 2023-11-15 RX ADMIN — AMLODIPINE BESYLATE 5 MG: 5 TABLET ORAL at 10:11

## 2023-11-15 RX ADMIN — LOSARTAN POTASSIUM 100 MG: 50 TABLET, FILM COATED ORAL at 06:11

## 2023-11-15 RX ADMIN — LEVOTHYROXINE SODIUM 50 MCG: 50 TABLET ORAL at 06:11

## 2023-11-15 RX ADMIN — IPRATROPIUM BROMIDE AND ALBUTEROL SULFATE 3 ML: 2.5; .5 SOLUTION RESPIRATORY (INHALATION) at 08:11

## 2023-11-15 RX ADMIN — PRAVASTATIN SODIUM 40 MG: 40 TABLET ORAL at 10:11

## 2023-11-15 RX ADMIN — ASPIRIN 81 MG: 81 TABLET, COATED ORAL at 08:11

## 2023-11-15 RX ADMIN — METOPROLOL TARTRATE 100 MG: 50 TABLET, FILM COATED ORAL at 10:11

## 2023-11-15 RX ADMIN — FUROSEMIDE 40 MG: 40 TABLET ORAL at 06:11

## 2023-11-15 RX ADMIN — Medication 6 MG: at 10:11

## 2023-11-15 RX ADMIN — TRAZODONE HYDROCHLORIDE 200 MG: 100 TABLET ORAL at 10:11

## 2023-11-15 RX ADMIN — GUAIFENESIN 600 MG: 600 TABLET, EXTENDED RELEASE ORAL at 08:11

## 2023-11-15 RX ADMIN — CLONAZEPAM 1 MG: 1 TABLET ORAL at 10:11

## 2023-11-15 RX ADMIN — GUAIFENESIN 200 MG: 200 SOLUTION ORAL at 05:11

## 2023-11-15 RX ADMIN — CITALOPRAM HYDROBROMIDE 40 MG: 20 TABLET ORAL at 06:11

## 2023-11-15 RX ADMIN — ACETAMINOPHEN 650 MG: 325 TABLET, FILM COATED ORAL at 10:11

## 2023-11-15 RX ADMIN — BUDESONIDE INHALATION 0.25 MG: 0.5 SUSPENSION RESPIRATORY (INHALATION) at 08:11

## 2023-11-15 RX ADMIN — GUAIFENESIN 600 MG: 600 TABLET, EXTENDED RELEASE ORAL at 10:11

## 2023-11-15 RX ADMIN — VANCOMYCIN HYDROCHLORIDE 1500 MG: 1.5 INJECTION, POWDER, LYOPHILIZED, FOR SOLUTION INTRAVENOUS at 02:11

## 2023-11-15 RX ADMIN — GUAIFENESIN 200 MG: 200 SOLUTION ORAL at 10:11

## 2023-11-15 RX ADMIN — ENOXAPARIN SODIUM 40 MG: 40 INJECTION SUBCUTANEOUS at 10:11

## 2023-11-15 RX ADMIN — DOCUSATE SODIUM 100 MG: 100 CAPSULE, LIQUID FILLED ORAL at 10:11

## 2023-11-15 RX ADMIN — BENZONATATE 100 MG: 100 CAPSULE ORAL at 10:11

## 2023-11-15 RX ADMIN — INSULIN ASPART 2 UNITS: 100 INJECTION, SOLUTION INTRAVENOUS; SUBCUTANEOUS at 05:11

## 2023-11-15 RX ADMIN — DOCUSATE SODIUM 100 MG: 100 CAPSULE, LIQUID FILLED ORAL at 08:11

## 2023-11-15 NOTE — PLAN OF CARE
Natalio with [facility] DME called and insurance approved rollator. He will reach out to patient and family.

## 2023-11-15 NOTE — PROGRESS NOTES
[facility] - 4th [facility] Medicine  Progress Note    Patient Name: Narcisa Perales  MRN: [id number]  Patient Class: IP- Inpatient   Admission Date: 11/10/2023  Length of Stay: 5 days  Attending Physician: Jennifer Tomlin DO  Primary Care Provider: Unique Yancey FNP        Subjective:     Principal Problem:Pneumonia of both lungs due to infectious organism        HPI:  Patient is an 83-year-old female with past medical history as below who has had progressive dyspnea over the last several days.  She denied fever or purulent sputum production.  She went to an urgent care today where she was found to be satting 88% on room air and was referred to the ER for further evaluation.  Here she arrived satting in the mid 90s on 2 L nasal cannula but afebrile and hemodynamically stable.  Laboratory work was unremarkable.  CT of the chest however showed left lower lobe and lingula and right middle lobe areas of infiltrate consistent with multifocal pneumonia.  Blood cultures were obtained and she was started on broad-spectrum antibiotics admitted to the hospitalist service for further management.  Was put on OxyMask and was weaned off currently on nasal cannula.  Blood cultures 2/2 positive for Staphylococcus hormone knee.  Susceptible to vancomycin.  Respiratory culture and urine cultures have been negative.     Overview/Hospital Course:  Patient is sitting up in the chair eating breakfast with oxygen by nasal cannula on.  She says she is feeling better than when she came in however she is still short of breath when getting up and also her cough is very significant.  Otherwise she has no new complaints at this time.        Review of Systems   Constitutional:  Negative for appetite change, chills and fever.   Respiratory:  Positive for cough, shortness of breath, wheezing and stridor.    Cardiovascular:  Negative for chest pain, palpitations and leg swelling.   Gastrointestinal:   Negative for abdominal pain, constipation and diarrhea.   Genitourinary:  Negative for difficulty urinating.   Neurological: Negative.    Psychiatric/Behavioral: Negative.     All other systems reviewed and are negative.    Objective:     Vital Signs (Most Recent):  Temp: 98.8 °F (37.1 °C) (11/15/23 0723)  Pulse: 66 (11/15/23 0723)  Resp: 18 (11/15/23 0723)  BP: (!) 164/71 (11/15/23 0723)  SpO2: (!) 91 % (11/15/23 0723) Vital Signs (24h Range):  Temp:  [97.9 °F (36.6 °C)-98.8 °F (37.1 °C)] 98.8 °F (37.1 °C)  Pulse:  [58-86] 66  Resp:  [17-20] 18  SpO2:  [89 %-94 %] 91 %  BP: (112-164)/(66-88) 164/71     Weight: 77.1 kg (170 lb)  Body mass index is 33.2 kg/m².    Intake/Output Summary (Last 24 hours) at 11/15/2023 0754  Last data filed at 11/15/2023 0734  Gross per 24 hour   Intake 1855.51 ml   Output --   Net 1855.51 ml         Physical Exam  Vitals and nursing note reviewed.   Constitutional:       Appearance: Normal appearance.   HENT:      Head: Normocephalic and atraumatic.   Cardiovascular:      Rate and Rhythm: Normal rate and regular rhythm.      Pulses: Normal pulses.      Heart sounds: Normal heart sounds.   Pulmonary:      Effort: No respiratory distress.      Breath sounds: No stridor. Wheezing and rales present.   Abdominal:      General: Bowel sounds are normal.      Palpations: Abdomen is soft.   Musculoskeletal:         General: Normal range of motion.   Skin:     General: Skin is warm and dry.   Neurological:      General: No focal deficit present.      Mental Status: She is alert and oriented to person, place, and time.   Psychiatric:         Mood and Affect: Mood normal.         Behavior: Behavior normal.             Significant Labs: All pertinent labs within the past 24 hours have been reviewed.  BMP:   Recent Labs   Lab 11/14/23  0410 11/15/23  0453   * 136   K 3.7 3.8   CO2 30 35*   BUN 15.5 14.3   CREATININE 0.81 0.80   CALCIUM 8.8 8.4   MG 1.70  --      CBC:   Recent Labs   Lab  11/15/23  0453   WBC 5.05   HGB 12.0   HCT 36.5*          Significant Imaging: I have reviewed all pertinent imaging results/findings within the past 24 hours.    Assessment/Plan:      No notes have been filed under this hospital service.  Service: Hospital Medicine    Community-acquired pneumonia  Acute hypoxemic respiratory failure secondary to above   Bacteremia 2/2 secondary to 1     HX:  HTN, DM2, HLD, CAD/stents, hypothyroidism     Patient continues to wean off of nasal cannula.  She continues on IV antibiotics based on culture results.  We will not change any of this at this time as she is improving slowly.  She continues on her home medications as well as nebulizer treatment.  Repeat blood cultures are negative at 12:00 p.m..  Continue monitoring and hopefully home in the next 24-48 hours.      VTE Risk Mitigation (From admission, onward)           Ordered     enoxaparin injection 40 mg  Daily         11/10/23 2147     IP VTE HIGH RISK PATIENT  Once         11/10/23 2147     Place sequential compression device  Until discontinued         11/10/23 2147                    Discharge Planning   SAM:      Code Status: Full Code   Is the patient medically ready for discharge?:     Reason for patient still in hospital (select all that apply): Treatment  Discharge Plan A: Home Health                  Corina Peña MD  Department of Hospital Medicine   [facility] - 4th Floor Medical Telemetry

## 2023-11-15 NOTE — SUBJECTIVE & OBJECTIVE
Review of Systems   Constitutional:  Negative for appetite change, chills and fever.   Respiratory:  Positive for cough, shortness of breath, wheezing and stridor.    Cardiovascular:  Negative for chest pain, palpitations and leg swelling.   Gastrointestinal:  Negative for abdominal pain, constipation and diarrhea.   Genitourinary:  Negative for difficulty urinating.   Neurological: Negative.    Psychiatric/Behavioral: Negative.     All other systems reviewed and are negative.    Objective:     Vital Signs (Most Recent):  Temp: 98.8 °F (37.1 °C) (11/15/23 0723)  Pulse: 66 (11/15/23 0723)  Resp: 18 (11/15/23 0723)  BP: (!) 164/71 (11/15/23 0723)  SpO2: (!) 91 % (11/15/23 0723) Vital Signs (24h Range):  Temp:  [97.9 °F (36.6 °C)-98.8 °F (37.1 °C)] 98.8 °F (37.1 °C)  Pulse:  [58-86] 66  Resp:  [17-20] 18  SpO2:  [89 %-94 %] 91 %  BP: (112-164)/(66-88) 164/71     Weight: 77.1 kg (170 lb)  Body mass index is 33.2 kg/m².    Intake/Output Summary (Last 24 hours) at 11/15/2023 0754  Last data filed at 11/15/2023 0734  Gross per 24 hour   Intake 1855.51 ml   Output --   Net 1855.51 ml         Physical Exam  Vitals and nursing note reviewed.   Constitutional:       Appearance: Normal appearance.   HENT:      Head: Normocephalic and atraumatic.   Cardiovascular:      Rate and Rhythm: Normal rate and regular rhythm.      Pulses: Normal pulses.      Heart sounds: Normal heart sounds.   Pulmonary:      Effort: No respiratory distress.      Breath sounds: No stridor. Wheezing and rales present.   Abdominal:      General: Bowel sounds are normal.      Palpations: Abdomen is soft.   Musculoskeletal:         General: Normal range of motion.   Skin:     General: Skin is warm and dry.   Neurological:      General: No focal deficit present.      Mental Status: She is alert and oriented to person, place, and time.   Psychiatric:         Mood and Affect: Mood normal.         Behavior: Behavior normal.             Significant Labs: All  pertinent labs within the past 24 hours have been reviewed.  BMP:   Recent Labs   Lab 11/14/23  0410 11/15/23  0453   * 136   K 3.7 3.8   CO2 30 35*   BUN 15.5 14.3   CREATININE 0.81 0.80   CALCIUM 8.8 8.4   MG 1.70  --      CBC:   Recent Labs   Lab 11/15/23  0453   WBC 5.05   HGB 12.0   HCT 36.5*          Significant Imaging: I have reviewed all pertinent imaging results/findings within the past 24 hours.

## 2023-11-15 NOTE — HPI
Patient is an 83-year-old female with past medical history as below who has had progressive dyspnea over the last several days.  She denied fever or purulent sputum production.  She went to an urgent care today where she was found to be satting 88% on room air and was referred to the ER for further evaluation.  Here she arrived satting in the mid 90s on 2 L nasal cannula but afebrile and hemodynamically stable.  Laboratory work was unremarkable.  CT of the chest however showed left lower lobe and lingula and right middle lobe areas of infiltrate consistent with multifocal pneumonia.  Blood cultures were obtained and she was started on broad-spectrum antibiotics admitted to the hospitalist service for further management.  Was put on OxyMask and was weaned off currently on nasal cannula.  Blood cultures 2/2 positive for Staphylococcus hormone knee.  Susceptible to vancomycin.  Respiratory culture and urine cultures have been negative.

## 2023-11-15 NOTE — PLAN OF CARE
Problem: Physical Therapy  Goal: Physical Therapy Goal  Description: Goals to be met by: 12/10/23     Patient will increase functional independence with mobility by performin. Supine to sit with Stand-by Assistance- MET  2. Sit to supine with Stand-by Assistance- MET  3. Sit to stand transfer with Stand-by Assistance  4. Bed to chair transfer with Stand-by Assistance using Rolling Walker- MET  5. Gait  x 150 feet with Stand-by Assistance using Rolling Walker- MET  6. Pt independent with all bed mobility.  7. Pt performs functional transfers Mod I/Independent with least restrictive AD.  8. Pt ambulates Ind/Mod Ind > or = 300ft with least restrictive AD.    Outcome: Ongoing, Progressing     PT/PTA conference regarding pt progress. Goals updated accordingly.

## 2023-11-15 NOTE — HOSPITAL COURSE
Patient is sitting up in the chair eating breakfast with oxygen by nasal cannula on.  She says she is feeling better than when she came in however she is still short of breath when getting up and also her cough is very significant.  Otherwise she has no new complaints at this time.

## 2023-11-15 NOTE — PT/OT/SLP PROGRESS
Physical Therapy Treatment    Patient Name:  Narcisa Perales   MRN:  [id number]    Recommendations:     Discharge therapy intensity: Low Intensity Therapy   Discharge Equipment Recommendations: walker, rolling  Barriers to discharge: acuity of medical condition    Assessment:     Narcisa Perales is a 83 y.o. female admitted with a medical diagnosis of Pneumonia of both lungs due to infectious organism.  She presents with the following impairments/functional limitations: weakness, impaired endurance, impaired functional mobility, impaired balance, decreased lower extremity function.    Rehab Prognosis: Good; patient would benefit from acute skilled PT services to address these deficits and reach maximum level of function.    Recent Surgery: * No surgery found *      Plan:     During this hospitalization, patient to be seen 5 x/week to address the identified rehab impairments via gait training, therapeutic activities, therapeutic exercises, neuromuscular re-education and progress toward the following goals:    Plan of Care Expires:  12/10/23    Subjective     Chief Complaint:   Patient/Family Comments/goals:   Pain/Comfort:         Objective:     Communicated with nursing prior to session.  Patient found up in chair with pulse ox (continuous), telemetry, oxygen upon PT entry to room.     General Precautions: Standard, fall  Orthopedic Precautions:    Braces:    Respiratory Status: Nasal cannula, flow 3 L/min, increased to 4L during amb, O2 87%-95% druing tx.  Blood Pressure:   Skin Integrity:     Functional Mobility:  Transfers:     Sit to Stand:  contact guard assistance with rollator  Gait: 265' w/rollator, SBA  1 standing break, slow cautious christina    Therapeutic Activities/Exercises:  Sit to stands, 10 reps, no UE support, CGA  Standing marches, 12 reps, HHA, CGA    Education:  Patient provided with verbal education education regarding use and safety of rollator.  Understanding was verbalized.     Patient left  up in chair with all lines intact and call button in reach..    GOALS:   Multidisciplinary Problems       Physical Therapy Goals          Problem: Physical Therapy    Goal Priority Disciplines Outcome Goal Variances Interventions   Physical Therapy Goal     PT, PT/OT Ongoing, Progressing     Description: Goals to be met by: 12/10/23     Patient will increase functional independence with mobility by performin. Supine to sit with Stand-by Assistance- MET  2. Sit to supine with Stand-by Assistance- MET  3. Sit to stand transfer with Stand-by Assistance  4. Bed to chair transfer with Stand-by Assistance using Rolling Walker- MET  5. Gait  x 150 feet with Stand-by Assistance using Rolling Walker- MET  6. Pt independent with all bed mobility.  7. Pt performs functional transfers Mod I/Independent with least restrictive AD.  8. Pt ambulates Ind/Mod Ind > or = 300ft with least restrictive AD.                         Time Tracking:     PT Received On: 11/15/23  PT Start Time: 1533     PT Stop Time: 1556  PT Total Time (min): 23 min     Billable Minutes: Gait Training 13 and Therapeutic Activity 10    Treatment Type: Treatment  PT/PTA: PTA     Number of PTA visits since last PT visit: 3     11/15/2023

## 2023-11-15 NOTE — PLAN OF CARE
Rollator has been approved. Patient will delivered upon discharge. Family will be contacted for details.

## 2023-11-16 LAB
ANION GAP SERPL CALC-SCNC: 4 MEQ/L
BASOPHILS # BLD AUTO: 0.01 X10(3)/MCL
BASOPHILS NFR BLD AUTO: 0.2 %
BUN SERPL-MCNC: 14.2 MG/DL (ref 9.8–20.1)
CALCIUM SERPL-MCNC: 8.3 MG/DL (ref 8.4–10.2)
CHLORIDE SERPL-SCNC: 94 MMOL/L (ref 98–107)
CO2 SERPL-SCNC: 38 MMOL/L (ref 23–31)
CREAT SERPL-MCNC: 0.74 MG/DL (ref 0.55–1.02)
CREAT/UREA NIT SERPL: 19
EOSINOPHIL # BLD AUTO: 0.04 X10(3)/MCL (ref 0–0.9)
EOSINOPHIL NFR BLD AUTO: 1 %
ERYTHROCYTE [DISTWIDTH] IN BLOOD BY AUTOMATED COUNT: 13.2 % (ref 11.5–17)
GFR SERPLBLD CREATININE-BSD FMLA CKD-EPI: >60 MLS/MIN/1.73/M2
GLUCOSE SERPL-MCNC: 110 MG/DL (ref 82–115)
HCT VFR BLD AUTO: 36.7 % (ref 37–47)
HGB BLD-MCNC: 12 G/DL (ref 12–16)
IMM GRANULOCYTES # BLD AUTO: 0.01 X10(3)/MCL (ref 0–0.04)
IMM GRANULOCYTES NFR BLD AUTO: 0.2 %
LYMPHOCYTES # BLD AUTO: 0.88 X10(3)/MCL (ref 0.6–4.6)
LYMPHOCYTES NFR BLD AUTO: 21.5 %
MCH RBC QN AUTO: 28.4 PG (ref 27–31)
MCHC RBC AUTO-ENTMCNC: 32.7 G/DL (ref 33–36)
MCV RBC AUTO: 86.8 FL (ref 80–94)
MONOCYTES # BLD AUTO: 0.46 X10(3)/MCL (ref 0.1–1.3)
MONOCYTES NFR BLD AUTO: 11.2 %
NEUTROPHILS # BLD AUTO: 2.69 X10(3)/MCL (ref 2.1–9.2)
NEUTROPHILS NFR BLD AUTO: 65.9 %
NRBC BLD AUTO-RTO: 0 %
PLATELET # BLD AUTO: 211 X10(3)/MCL (ref 130–400)
PMV BLD AUTO: 8.9 FL (ref 7.4–10.4)
POCT GLUCOSE: 101 MG/DL (ref 70–110)
POCT GLUCOSE: 155 MG/DL (ref 70–110)
POCT GLUCOSE: 158 MG/DL (ref 70–110)
POTASSIUM SERPL-SCNC: 3.3 MMOL/L (ref 3.5–5.1)
RBC # BLD AUTO: 4.23 X10(6)/MCL (ref 4.2–5.4)
SODIUM SERPL-SCNC: 136 MMOL/L (ref 136–145)
WBC # SPEC AUTO: 4.09 X10(3)/MCL (ref 4.5–11.5)

## 2023-11-16 PROCEDURE — 94640 AIRWAY INHALATION TREATMENT: CPT

## 2023-11-16 PROCEDURE — 97116 GAIT TRAINING THERAPY: CPT | Mod: CQ

## 2023-11-16 PROCEDURE — 97535 SELF CARE MNGMENT TRAINING: CPT | Mod: CO

## 2023-11-16 PROCEDURE — 25000003 PHARM REV CODE 250: Performed by: HOSPITALIST

## 2023-11-16 PROCEDURE — 25000003 PHARM REV CODE 250: Performed by: NURSE PRACTITIONER

## 2023-11-16 PROCEDURE — 25000242 PHARM REV CODE 250 ALT 637 W/ HCPCS: Performed by: INTERNAL MEDICINE

## 2023-11-16 PROCEDURE — 21400001 HC TELEMETRY ROOM

## 2023-11-16 PROCEDURE — 63600175 PHARM REV CODE 636 W HCPCS: Performed by: INTERNAL MEDICINE

## 2023-11-16 PROCEDURE — 99900035 HC TECH TIME PER 15 MIN (STAT)

## 2023-11-16 PROCEDURE — 25000003 PHARM REV CODE 250: Performed by: STUDENT IN AN ORGANIZED HEALTH CARE EDUCATION/TRAINING PROGRAM

## 2023-11-16 PROCEDURE — 25000003 PHARM REV CODE 250: Performed by: INTERNAL MEDICINE

## 2023-11-16 PROCEDURE — 85025 COMPLETE CBC W/AUTO DIFF WBC: CPT | Performed by: INTERNAL MEDICINE

## 2023-11-16 PROCEDURE — 63600175 PHARM REV CODE 636 W HCPCS: Performed by: STUDENT IN AN ORGANIZED HEALTH CARE EDUCATION/TRAINING PROGRAM

## 2023-11-16 PROCEDURE — 80048 BASIC METABOLIC PNL TOTAL CA: CPT | Performed by: INTERNAL MEDICINE

## 2023-11-16 PROCEDURE — 27000221 HC OXYGEN, UP TO 24 HOURS

## 2023-11-16 RX ORDER — POTASSIUM CHLORIDE 20 MEQ/1
40 TABLET, EXTENDED RELEASE ORAL ONCE
Status: COMPLETED | OUTPATIENT
Start: 2023-11-16 | End: 2023-11-16

## 2023-11-16 RX ORDER — GUAIFENESIN/DEXTROMETHORPHAN 100-10MG/5
5 SYRUP ORAL EVERY 4 HOURS PRN
Status: DISCONTINUED | OUTPATIENT
Start: 2023-11-16 | End: 2023-11-21 | Stop reason: HOSPADM

## 2023-11-16 RX ADMIN — TRAZODONE HYDROCHLORIDE 200 MG: 100 TABLET ORAL at 08:11

## 2023-11-16 RX ADMIN — AMLODIPINE BESYLATE 5 MG: 5 TABLET ORAL at 08:11

## 2023-11-16 RX ADMIN — METOPROLOL TARTRATE 100 MG: 50 TABLET, FILM COATED ORAL at 08:11

## 2023-11-16 RX ADMIN — IPRATROPIUM BROMIDE AND ALBUTEROL SULFATE 3 ML: 2.5; .5 SOLUTION RESPIRATORY (INHALATION) at 04:11

## 2023-11-16 RX ADMIN — PRAVASTATIN SODIUM 40 MG: 40 TABLET ORAL at 08:11

## 2023-11-16 RX ADMIN — FUROSEMIDE 40 MG: 40 TABLET ORAL at 06:11

## 2023-11-16 RX ADMIN — ENOXAPARIN SODIUM 40 MG: 40 INJECTION SUBCUTANEOUS at 08:11

## 2023-11-16 RX ADMIN — GUAIFENESIN 600 MG: 600 TABLET, EXTENDED RELEASE ORAL at 08:11

## 2023-11-16 RX ADMIN — LOSARTAN POTASSIUM 100 MG: 50 TABLET, FILM COATED ORAL at 06:11

## 2023-11-16 RX ADMIN — VANCOMYCIN HYDROCHLORIDE 1500 MG: 1.5 INJECTION, POWDER, LYOPHILIZED, FOR SOLUTION INTRAVENOUS at 04:11

## 2023-11-16 RX ADMIN — DOCUSATE SODIUM 100 MG: 100 CAPSULE, LIQUID FILLED ORAL at 08:11

## 2023-11-16 RX ADMIN — POTASSIUM CHLORIDE 40 MEQ: 1500 TABLET, EXTENDED RELEASE ORAL at 08:11

## 2023-11-16 RX ADMIN — ACETAMINOPHEN 650 MG: 325 TABLET, FILM COATED ORAL at 09:11

## 2023-11-16 RX ADMIN — GUAIFENESIN AND DEXTROMETHORPHAN 5 ML: 100; 10 SYRUP ORAL at 04:11

## 2023-11-16 RX ADMIN — ASPIRIN 81 MG: 81 TABLET, COATED ORAL at 08:11

## 2023-11-16 RX ADMIN — POLYETHYLENE GLYCOL 3350 17 G: 17 POWDER, FOR SOLUTION ORAL at 09:11

## 2023-11-16 RX ADMIN — CITALOPRAM HYDROBROMIDE 40 MG: 20 TABLET ORAL at 06:11

## 2023-11-16 NOTE — PT/OT/SLP PROGRESS
Occupational Therapy   Treatment    Name: Narcisa Perales  MRN: [id number]  Admitting Diagnosis:  Pneumonia of both lungs due to infectious organism       Recommendations:     Recommended therapy intensity at discharge: Low Intensity Therapy   Discharge Equipment Recommendations:  walker, rolling  Barriers to discharge:       Assessment:     Narcisa Perales is a 83 y.o. female with a medical diagnosis of Pneumonia of both lungs due to infectious organism. Performance deficits affecting function are weakness, impaired endurance, impaired cardiopulmonary response to activity, impaired functional mobility, impaired self care skills.     Rehab Prognosis:  Good; patient would benefit from acute skilled OT services to address these deficits and reach maximum level of function.       Plan:     Patient to be seen 3 x/week to address the above listed problems via self-care/home management, therapeutic activities, therapeutic exercises  Plan of Care Expires: 12/13/23  Plan of Care Reviewed with: patient    Subjective     Pain/Comfort:  Pain Rating 1: 0/10    Objective:     Communicated with: RN prior to session.  Patient found supine with pulse ox (continuous), telemetry, oxygen, peripheral IV upon OT entry to room.    General Precautions: Standard, fall    Orthopedic Precautions:N/A  Braces: N/A  Respiratory Status: Nasal cannula, flow 3 L/min  Vital Signs: Sp02: 88-93%     Occupational Performance:     Bed Mobility:    Patient completed Supine to Sit with stand by assistance     Functional Mobility/Transfers:  Patient completed Sit <> Stand Transfer with stand by assistance  with  rolling walker   Functional Mobility: SBA with RW throughout room. Needed rest breaks 2/2 SOB and coughing spells.     Activities of Daily Living:  Toileting: performed toilet t/f with SBA. Able to complete seated pericare independently.   Grooming: completed oral hygiene standing at sink with SBA.   LE dressing: donned slip-on shoes with  independence.     Therapeutic Positioning    OT interventions performed during the course of today's session in an effort to prevent and/or reduce acquired pressure injuries:   Education was provided on benefits of and recommendations for therapeutic positioning    Skin assessment: all bony prominences were assessed    Findings: no redness or breakdown noted    [facility] 6 Click ADL:      Patient Education:  Patient provided with verbal education education regarding OT role/goals/POC and safety awareness.  Understanding was verbalized.      Patient left up in chair with all lines intact and call button in reach    GOALS:   Multidisciplinary Problems       Occupational Therapy Goals          Problem: Occupational Therapy    Goal Priority Disciplines Outcome Interventions   Occupational Therapy Goal     OT, PT/OT Ongoing, Progressing    Description: Goals to be met by: in 1 month     Patient will increase functional independence with ADLs by performing:    UE Dressing with Modified [place].  LE Dressing with Modified [place].  Grooming while standing at sink with Modified [place].  Toileting from toilet with Modified [place] for hygiene and clothing management.   Toilet transfer to toilet with Modified [place].                         Time Tracking:     OT Date of Treatment: 11/16/23  OT Start Time: 1328  OT Stop Time: 1351  OT Total Time (min): 23 min    Billable Minutes:Self Care/Home Management 23    OT/JONE: JONE     Number of JONE visits since last OT visit: 1    11/16/2023

## 2023-11-16 NOTE — PROGRESS NOTES
[facility] Hospital Medicine Progress Note        Chief Complaint: Inpatient Follow-up     HPI:    83-year-old female with past medical history as below who has had progressive dyspnea over the last several days.  She denied fever or purulent sputum production.  She went to an urgent care today where she was found to be satting 88% on room air and was referred to the ER for further evaluation.  Here she arrived satting in the mid 90s on 2 L nasal cannula but afebrile and hemodynamically stable.  Laboratory work was unremarkable.  CT of the chest however showed left lower lobe and lingula and right middle lobe areas of infiltrate consistent with multifocal pneumonia.  Blood cultures were obtained and she was started on broad-spectrum antibiotics admitted to the hospitalist service for further management.  Was put on OxyMask and was weaned off currently on nasal cannula.  Blood cultures 2/2 positive for Staphylococcus hormone knee.  Susceptible to vancomycin.  Respiratory culture and urine cultures have been negative.        Interval Hx:  Patient doing okay.  She is laying down in bed.  Currently with an OxyMask going at 10 L.  Oxygenation at 94%.  She does have some increased work of breathing in between coughing fits.  Cough is nonproductive all in the room.  She states that she is been using her incentive spirometer some the probably not enough.  She states that it makes her cough more.  I encouraged her to continue using it at the coughing is part of the process.  We need to clear her secretions.  I will start her on some guaifenesin if not already on board.    Objective/physical exam:  General:  Moderate distress, afebrile  Chest:  Bilateral rhonchi, increased work of breathing  Heart: RRR, +S1, S2, no appreciable murmur  Abdomen: Soft, nontender, BS +  MSK: Warm, no lower extremity edema, no clubbing or cyanosis  Neurologic: Alert and oriented x4, Cranial nerve II-XII intact,  Strength 5/5 in all 4 extremities    VITAL SIGNS: 24 HRS MIN & MAX LAST   Temp  Min: 97.3 °F (36.3 °C)  Max: 99.1 °F (37.3 °C) 99.1 °F (37.3 °C)   BP  Min: 149/72  Max: 164/71 (!) 149/72   Pulse  Min: 63  Max: 89  83   Resp  Min: 16  Max: 22 20   SpO2  Min: 91 %  Max: 94 % (!) 94 %       Recent Labs   Lab 11/13/23  0330 11/15/23  0453 11/16/23  0424   WBC 4.90 5.05 4.09*   RBC 4.33 4.26 4.23   HGB 12.3 12.0 12.0   HCT 37.4 36.5* 36.7*   MCV 86.4 85.7 86.8   MCH 28.4 28.2 28.4   MCHC 32.9* 32.9* 32.7*   RDW 12.9 13.2 13.2    198 211   MPV 9.3 9.1 8.9       Recent Labs   Lab 11/10/23  1712 11/11/23  0400 11/12/23  0722 11/14/23  0410 11/15/23  0453 11/16/23  0424    135*   < > 135* 136 136   K 4.3 3.3*   < > 3.7 3.8 3.3*   CO2 26 29   < > 30 35* 38*   BUN 10.2 9.4*   < > 15.5 14.3 14.2   CREATININE 0.70 0.63   < > 0.81 0.80 0.74   CALCIUM 8.7 8.0*   < > 8.8 8.4 8.3*   MG 1.60  --   --  1.70  --   --    ALBUMIN 3.7 3.2*  --   --   --   --    ALKPHOS 75 65  --   --   --   --    ALT 16 14  --   --   --   --    AST 19 16  --   --   --   --    BILITOT 0.8 0.5  --   --   --   --     < > = values in this interval not displayed.          Microbiology Results (last 7 days)       Procedure Component Value Units Date/Time    Blood culture x two cultures. Draw prior to antibiotics. [[id number]]  (Normal) Collected: 11/10/23 1712    Order Status: Completed Specimen: Blood Updated: 11/15/23 2101     CULTURE, BLOOD ([facility]) No Growth at 5 days    Blood Culture [[id number]]  (Normal) Collected: 11/13/23 0837    Order Status: Completed Specimen: Blood from Arm, Right Updated: 11/15/23 1002     CULTURE, BLOOD ([facility]) No Growth At 48 Hours    Blood Culture [[id number]]  (Normal) Collected: 11/13/23 0837    Order Status: Completed Specimen: Blood from Arm, Left Updated: 11/15/23 1002     CULTURE, BLOOD ([facility]) No Growth At 48 Hours    Blood culture x two cultures. Draw prior to antibiotics. [[id number]]  (Abnormal)  (Susceptibility)  Collected: 11/10/23 1712    Order Status: Completed Specimen: Blood Updated: 11/13/23 0651     CULTURE, BLOOD ([facility]) Staphylococcus hominis     GRAM STAIN Gram Positive Cocci, probable Staphylococcus      Seen in gram stain of broth only      2 of 2 bottles positive    BCID2 Panel [[id number]]  (Abnormal) Collected: 11/10/23 1712    Order Status: Completed Specimen: Blood Updated: 11/11/23 1340     CTX-M (ESBL ) N/A     IMP (Cabapenemase ) N/A     KPC resistance gene (Carbapenemase ) N/A     mcr-1 N/A     mecA ID N/A     Comment: Note: Antimicrobial resistance can occur via multiple mechanisms. A Not Detected result for antimicrobial resistance gene(s) does not indicate antimicrobial susceptibility. Subculturing is required for species identification and susceptibility testing of   isolates.        mecA/C and MREJ (MRSA) gene N/A     NDM (Carbapenemase ) N/A     OXA-48-like (Carbapenemase ) N/A     Denisse/B (VRE gene) N/A     VIM (Carbapenemase ) N/A     Enterococcus faecalis Not Detected     Enterococcus faecium Not Detected     Listeria monocytogenes Not Detected     Staphylococcus spp. Detected     Staphylococcus aureus Not Detected     Staphylococcus epidermidis Not Detected     Staphylococcus lugdunensis Not Detected     Streptococcus spp. Not Detected     Streptococcus agalactiae (Group B) Not Detected     Streptococcus pneumoniae Not Detected     Streptococcus pyogenes (Group A) Not Detected     Acinetobacter calcoaceticus/baumannii complex Not Detected     Bacteroides fragilis Not Detected     Enterobacterales Not Detected     Enterobacter cloacae complex Not Detected     Escherichia coli Not Detected     Klebsiella aerogenes Not Detected     Klebsiella oxytoca Not Detected     Klebsiella pneumoniae group Not Detected     Proteus spp. Not Detected     Salmonella spp. Not Detected     Serratia marcescens Not Detected     Haemophilus influenzae Not Detected      Neisseria meningitidis Not Detected     Pseudomonas aeruginosa Not Detected     Stenotrophomonas maltophilia Not Detected     Candida albicans Not Detected     Candida auris Not Detected     Candida glabrata Not Detected     Candida krusei Not Detected     Candida parapsilosis Not Detected     Candida tropicalis Not Detected     Cryptococcus neoformans/gattii Not Detected    Narrative:      The [employer] BCID2 Panel is a multiplexed nucleic acid test intended for the use with [employer]® 2.0 or [employer]® [employer] for the simultaneous qualitative detection and identification of multiple bacterial and yeast nucleic acids and select genetic determinants associated with antimicrobial resistance.  The [employer] BCID2 Panel test is performed directly on blood culture samples identified as positive by a continuous monitoring blood culture system.  Results are intended to be interpreted in conjunction with Gram stain results.    Respiratory Culture [[id number]]     Order Status: Sent Specimen: Sputum, Expectorated              Radiology:  Echo    Left Ventricle: The left ventricle is normal in size. Normal wall   thickness. Normal wall motion. There is normal systolic function with a   visually estimated ejection fraction of 55 - 60%. Grade I diastolic   dysfunction.    Right Ventricle: Normal right ventricular cavity size. Systolic   function is borderline low. TAPSE is 1.48 cm.    Left Atrium: Left atrium is mildly dilated.    Aortic Valve: There is mild aortic valve sclerosis. There is mild   aortic regurgitation.    Mitral Valve: Mildly thickened leaflets. There is mild regurgitation.    Tricuspid Valve: There is mild regurgitation.      Scheduled Med:   amLODIPine  5 mg Oral QHS    aspirin  81 mg Oral Daily    budesonide  0.25 mg Nebulization Daily    citalopram  40 mg Oral QAM    docusate sodium  100 mg Oral BID    enoxparin  40 mg Subcutaneous Daily    furosemide  40 mg Oral QAM    guaiFENesin  600  mg Oral BID    levothyroxine  50 mcg Oral Before breakfast    losartan  100 mg Oral QAM    metoprolol tartrate  100 mg Oral QHS    pravastatin  40 mg Oral QHS    traZODone  200 mg Oral Nightly    vancomycin (VANCOCIN) IV (PEDS and ADULTS)  1,500 mg Intravenous Q24H        Continuous Infusions:       PRN Meds:  sodium chloride 0.9%, sodium chloride 0.9%, sodium chloride 0.9%, sodium chloride 0.9%, acetaminophen, albuterol-ipratropium, benzonatate, clonazePAM, dextromethorphan-guaiFENesin  mg/5 ml, dextrose 10%, dextrose 10%, glucagon (human recombinant), glucose, glucose, insulin aspart U-100, melatonin, polyethylene glycol, sodium chloride 0.9%, vancomycin - pharmacy to dose       Assessment/Plan:   Community-acquired pneumonia, bilaterally   Acute hypoxemic respiratory failure   Bacteremia secondary to pneumonia   Essential hypertension  Hypokalemia  Type 2 diabetes mellitus  Hyperlipidemia  Coronary artery disease s/p PCI  Hypothyroidism    Patient had an acute worsening of her hypoxemia overnight.  Currently on 10 L.  She is had increased coughing as well.  Last chest imaging done about 6 days ago so will go ahead and repeat this morning.  We would a chest x-ray for now.  Try to wean her oxygen levels back down if possible.    Continue with neb treatments as needed.  Encouraged her to continue using her incentive spirometer and working with physical therapy.  She is currently on IV vancomycin for staph hominis grown in 2 of 2 blood cultures.  Repeat blood cultures are now negative.  Today is day 6 of total treatment.  Will plan to continue through tomorrow and then discontinue.  She does have significant degree of deconditioning.  May require placement upon discharge.  Otherwise chronic medical issues are stable    Critical care diagnosis: acute hypoxemic respiratory failure requiring high-flow oxygen  Critical care interventions: hands on evaluation, review of labs/radiographs/records and discussions with  family  Critical care time spent: >32 minutes      Chriss Canales MD   11/16/2023     All diagnosis and differential diagnosis have been reviewed; assessment and plan has been documented; I have personally reviewed the labs and test results that are presently available; I have reviewed the patients medication list; I have reviewed the consulting providers response and recommendations. I have reviewed or attempted to review medical records based upon their availability    All of the patient's questions have been  addressed and answered. Patient's is agreeable to the above stated plan. I will continue to monitor closely and make adjustments to medical management as needed.  _____________________________________________________________________

## 2023-11-16 NOTE — PROGRESS NOTES
Inpatient Nutrition Evaluation    Admit Date: 11/10/2023   Total duration of encounter: 6 days    Nutrition Recommendation/Prescription     Continue diabetic diet as tolerated  Continue bowel regimen as medically feasible  RD to monitor po intake and weight    Nutrition Assessment     Chart Review    Reason Seen: length of stay    Malnutrition Screening Tool Results   Have you recently lost weight without trying?: No  Have you been eating poorly because of a decreased appetite?: No   MST Score: 0     Diagnosis:  Community-acquired pneumonia, bilaterally   Acute hypoxemic respiratory failure   Bacteremia secondary to pneumonia   Hypokalemia     Relevant Medical History: HTN, DM 2, HLD, CAD s/p PCI, hypothyroidism     Nutrition-Related Medications: colace, lasix, insulin aspart prn, miralax prn    Nutrition-Related Labs: : WBC-4.09, Hct-36.7, K-3.3, Cl-94, horace-8.3      Diet Order: Diet diabetic  Oral Supplement Order: none  Appetite/Oral Intake: good/% of meals  Factors Affecting Nutritional Intake: constipation  Food/Zoroastrianism/Cultural Preferences: none reported  Food Allergies: no known food allergies       Wound(s):       Comments    : pt reports good appetite, improved since admission. Good appetite prior admission. Denied need for ONS. Complained of constipation, continue meds. Denies unintentional weight loss. Per EMR weights, weight stable.    Anthropometrics    Height: 5' (152.4 cm) Height Method: Stated  Last Weight: 77.1 kg (170 lb) (11/10/23 1640) Weight Method: Stated  BMI (Calculated): 33.2  BMI Classification: obese grade I (BMI 30-34.9)     Ideal Body Weight (IBW), Female: 100 lb     % Ideal Body Weight, Female (lb): 170 %                    Usual Body Weight (UBW), k.1 kg  % Usual Body Weight: 100.22     Usual Weight Provided By: EMR weight history and patient denies unintentional weight loss    Wt Readings from Last 5 Encounters:   11/10/23 77.1 kg (170 lb)   23 77.1 kg (170  lb)   06/27/22 80.3 kg (177 lb)   09/18/19 83 kg (182 lb 15.7 oz)     Weight Change(s) Since Admission:  Admit Weight: 77.1 kg (170 lb) (11/10/23 1640)      Patient Education    Not applicable.    Monitoring & Evaluation     Dietitian will monitor food and beverage intake, weight, and electrolyte/renal panel.  Nutrition Risk/Follow-Up: low (follow-up in 5-7 days)  Patients assigned 'low nutrition risk' status do not qualify for a full nutritional assessment but will be monitored and re-evaluated in a 5-7 day time period. Please consult if re-evaluation needed sooner.

## 2023-11-16 NOTE — PT/OT/SLP PROGRESS
Physical Therapy Treatment    Patient Name:  Narcisa Perales   MRN:  [id number]    Recommendations:     Discharge therapy intensity: Low Intensity Therapy   Discharge Equipment Recommendations: walker, rolling  Barriers to discharge: Ongoing medical needs    Assessment:     Narcisa Perales is a 83 y.o. female admitted with a medical diagnosis of Pneumonia of both lungs due to infectious organism.  She presents with the following impairments/functional limitations: weakness, impaired endurance, impaired functional mobility, impaired balance, decreased lower extremity function. Pt. With more fatigue today.    Rehab Prognosis: Good; patient would benefit from acute skilled PT services to address these deficits and reach maximum level of function.    Recent Surgery: * No surgery found *      Plan:     During this hospitalization, patient to be seen 5 x/week to address the identified rehab impairments via gait training, therapeutic activities, therapeutic exercises, neuromuscular re-education and progress toward the following goals:    Plan of Care Expires:  12/10/23    Subjective     Chief Complaint: 'my nose burns' & c/o feeling weak  Patient/Family Comments/goals:   Pain/Comfort:         Objective:     Communicated with nurse prior to session.  Patient found up in chair with pulse ox (continuous), telemetry, oxygen upon PT entry to room.     General Precautions: Standard, fall  Orthopedic Precautions:    Braces:    Respiratory Status: Nasal cannula, flow 3 L/min, increased to 5L during amb, O2 stayed with the low 90s during amb.  Blood Pressure:   Skin Integrity:     Functional Mobility:  Transfers:     Sit to Stand:  contact guard assistance with rolling walker  Gait: 200' w/RW, SBA/CGA  Noted slow christina, no LOB     Patient left up in chair with all lines intact, call button in reach, and nurse notified.    GOALS:   Multidisciplinary Problems       Physical Therapy Goals          Problem: Physical Therapy    Goal  Priority Disciplines Outcome Goal Variances Interventions   Physical Therapy Goal     PT, PT/OT Ongoing, Progressing     Description: Goals to be met by: 12/10/23     Patient will increase functional independence with mobility by performin. Supine to sit with Stand-by Assistance- MET  2. Sit to supine with Stand-by Assistance- MET  3. Sit to stand transfer with Stand-by Assistance  4. Bed to chair transfer with Stand-by Assistance using Rolling Walker- MET  5. Gait  x 150 feet with Stand-by Assistance using Rolling Walker- MET  6. Pt independent with all bed mobility.  7. Pt performs functional transfers Mod I/Independent with least restrictive AD.  8. Pt ambulates Ind/Mod Ind > or = 300ft with least restrictive AD.                         Time Tracking:     PT Received On: 23  PT Start Time: 1401     PT Stop Time: 1417  PT Total Time (min): 16 min     Billable Minutes: Gait Training 16    Treatment Type: Treatment  PT/PTA: PTA     Number of PTA visits since last PT visit: 4     2023

## 2023-11-17 LAB
POCT GLUCOSE: 127 MG/DL (ref 70–110)
POCT GLUCOSE: 143 MG/DL (ref 70–110)
VANCOMYCIN TROUGH SERPL-MCNC: 16.3 UG/ML (ref 15–20)

## 2023-11-17 PROCEDURE — 25000003 PHARM REV CODE 250: Performed by: STUDENT IN AN ORGANIZED HEALTH CARE EDUCATION/TRAINING PROGRAM

## 2023-11-17 PROCEDURE — 25000242 PHARM REV CODE 250 ALT 637 W/ HCPCS: Performed by: STUDENT IN AN ORGANIZED HEALTH CARE EDUCATION/TRAINING PROGRAM

## 2023-11-17 PROCEDURE — 97164 PT RE-EVAL EST PLAN CARE: CPT

## 2023-11-17 PROCEDURE — 97530 THERAPEUTIC ACTIVITIES: CPT

## 2023-11-17 PROCEDURE — 25000003 PHARM REV CODE 250: Performed by: NURSE PRACTITIONER

## 2023-11-17 PROCEDURE — 94640 AIRWAY INHALATION TREATMENT: CPT

## 2023-11-17 PROCEDURE — 21400001 HC TELEMETRY ROOM

## 2023-11-17 PROCEDURE — 80202 ASSAY OF VANCOMYCIN: CPT | Performed by: HOSPITALIST

## 2023-11-17 PROCEDURE — 63600175 PHARM REV CODE 636 W HCPCS: Performed by: INTERNAL MEDICINE

## 2023-11-17 PROCEDURE — 63600175 PHARM REV CODE 636 W HCPCS: Performed by: STUDENT IN AN ORGANIZED HEALTH CARE EDUCATION/TRAINING PROGRAM

## 2023-11-17 PROCEDURE — 94761 N-INVAS EAR/PLS OXIMETRY MLT: CPT

## 2023-11-17 PROCEDURE — 27000221 HC OXYGEN, UP TO 24 HOURS

## 2023-11-17 PROCEDURE — 99900035 HC TECH TIME PER 15 MIN (STAT)

## 2023-11-17 PROCEDURE — 25000242 PHARM REV CODE 250 ALT 637 W/ HCPCS: Performed by: INTERNAL MEDICINE

## 2023-11-17 PROCEDURE — 25000003 PHARM REV CODE 250: Performed by: INTERNAL MEDICINE

## 2023-11-17 RX ORDER — ACETYLCYSTEINE 100 MG/ML
4 SOLUTION ORAL; RESPIRATORY (INHALATION) ONCE
Status: COMPLETED | OUTPATIENT
Start: 2023-11-17 | End: 2023-11-17

## 2023-11-17 RX ORDER — BENZONATATE 100 MG/1
200 CAPSULE ORAL 3 TIMES DAILY
Status: DISCONTINUED | OUTPATIENT
Start: 2023-11-17 | End: 2023-11-21 | Stop reason: HOSPADM

## 2023-11-17 RX ADMIN — BENZONATATE 200 MG: 100 CAPSULE ORAL at 10:11

## 2023-11-17 RX ADMIN — VANCOMYCIN HYDROCHLORIDE 1500 MG: 1.5 INJECTION, POWDER, LYOPHILIZED, FOR SOLUTION INTRAVENOUS at 05:11

## 2023-11-17 RX ADMIN — DOCUSATE SODIUM 100 MG: 100 CAPSULE, LIQUID FILLED ORAL at 10:11

## 2023-11-17 RX ADMIN — ACETAMINOPHEN 650 MG: 325 TABLET, FILM COATED ORAL at 10:11

## 2023-11-17 RX ADMIN — LOSARTAN POTASSIUM 100 MG: 50 TABLET, FILM COATED ORAL at 06:11

## 2023-11-17 RX ADMIN — BUDESONIDE INHALATION 0.25 MG: 0.5 SUSPENSION RESPIRATORY (INHALATION) at 07:11

## 2023-11-17 RX ADMIN — ACETYLCYSTEINE 4 ML: 100 INHALANT RESPIRATORY (INHALATION) at 05:11

## 2023-11-17 RX ADMIN — ACETAMINOPHEN 650 MG: 325 TABLET, FILM COATED ORAL at 05:11

## 2023-11-17 RX ADMIN — GUAIFENESIN AND DEXTROMETHORPHAN 5 ML: 100; 10 SYRUP ORAL at 10:11

## 2023-11-17 RX ADMIN — TRAZODONE HYDROCHLORIDE 200 MG: 100 TABLET ORAL at 10:11

## 2023-11-17 RX ADMIN — GUAIFENESIN 600 MG: 600 TABLET, EXTENDED RELEASE ORAL at 10:11

## 2023-11-17 RX ADMIN — DOCUSATE SODIUM 100 MG: 100 CAPSULE, LIQUID FILLED ORAL at 08:11

## 2023-11-17 RX ADMIN — GUAIFENESIN AND DEXTROMETHORPHAN 5 ML: 100; 10 SYRUP ORAL at 08:11

## 2023-11-17 RX ADMIN — AMLODIPINE BESYLATE 5 MG: 5 TABLET ORAL at 10:11

## 2023-11-17 RX ADMIN — GUAIFENESIN 600 MG: 600 TABLET, EXTENDED RELEASE ORAL at 08:11

## 2023-11-17 RX ADMIN — IPRATROPIUM BROMIDE AND ALBUTEROL SULFATE 3 ML: 2.5; .5 SOLUTION RESPIRATORY (INHALATION) at 05:11

## 2023-11-17 RX ADMIN — FUROSEMIDE 40 MG: 40 TABLET ORAL at 06:11

## 2023-11-17 RX ADMIN — METOPROLOL TARTRATE 100 MG: 50 TABLET, FILM COATED ORAL at 10:11

## 2023-11-17 RX ADMIN — Medication 6 MG: at 10:11

## 2023-11-17 RX ADMIN — CITALOPRAM HYDROBROMIDE 40 MG: 20 TABLET ORAL at 06:11

## 2023-11-17 RX ADMIN — ASPIRIN 81 MG: 81 TABLET, COATED ORAL at 08:11

## 2023-11-17 RX ADMIN — PRAVASTATIN SODIUM 40 MG: 40 TABLET ORAL at 10:11

## 2023-11-17 RX ADMIN — LEVOTHYROXINE SODIUM 50 MCG: 50 TABLET ORAL at 05:11

## 2023-11-17 RX ADMIN — ENOXAPARIN SODIUM 40 MG: 40 INJECTION SUBCUTANEOUS at 10:11

## 2023-11-17 RX ADMIN — CLONAZEPAM 1 MG: 1 TABLET ORAL at 10:11

## 2023-11-17 NOTE — PROGRESS NOTES
[facility] Hospital Medicine Progress Note        Chief Complaint: Inpatient Follow-up     HPI:    83-year-old female with past medical history as below who has had progressive dyspnea over the last several days.  She denied fever or purulent sputum production.  She went to an urgent care today where she was found to be satting 88% on room air and was referred to the ER for further evaluation.  Here she arrived satting in the mid 90s on 2 L nasal cannula but afebrile and hemodynamically stable.  Laboratory work was unremarkable.  CT of the chest however showed left lower lobe and lingula and right middle lobe areas of infiltrate consistent with multifocal pneumonia.  Blood cultures were obtained and she was started on broad-spectrum antibiotics admitted to the hospitalist service for further management.  Was put on OxyMask and was weaned off currently on nasal cannula.  Blood cultures 2/2 positive for Staphylococcus hormone knee.  Susceptible to vancomycin.  Respiratory culture and urine cultures have been negative.        Interval Hx:  Tolerating being weaned down to nasal cannula, has persistent coughing with deep breathing.     Objective/physical exam:  General: No distress  Chest: CTAB  Heart: RRR, +S1, S2, no appreciable murmur  Abdomen: Soft, nontender, BS +  MSK: Warm, no lower extremity edema, no clubbing or cyanosis  Neurologic: Alert and oriented x4, Cranial nerve II-XII intact, Strength 5/5 in all 4 extremities    VITAL SIGNS: 24 HRS MIN & MAX LAST   Temp  Min: 98 °F (36.7 °C)  Max: 99.2 °F (37.3 °C) 98.3 °F (36.8 °C)   BP  Min: 104/62  Max: 152/72 104/62   Pulse  Min: 66  Max: 97  97   Resp  Min: 18  Max: 20 18   SpO2  Min: 92 %  Max: 95 % (!) 93 %       Recent Labs   Lab 11/13/23  0330 11/15/23  0453 11/16/23  0424   WBC 4.90 5.05 4.09*   RBC 4.33 4.26 4.23   HGB 12.3 12.0 12.0   HCT 37.4 36.5* 36.7*   MCV 86.4 85.7 86.8   MCH 28.4 28.2 28.4   MCHC 32.9* 32.9* 32.7*   RDW  12.9 13.2 13.2    198 211   MPV 9.3 9.1 8.9       Recent Labs   Lab 11/10/23  1712 11/11/23  0400 11/12/23  0722 11/14/23  0410 11/15/23  0453 11/16/23  0424    135*   < > 135* 136 136   K 4.3 3.3*   < > 3.7 3.8 3.3*   CO2 26 29   < > 30 35* 38*   BUN 10.2 9.4*   < > 15.5 14.3 14.2   CREATININE 0.70 0.63   < > 0.81 0.80 0.74   CALCIUM 8.7 8.0*   < > 8.8 8.4 8.3*   MG 1.60  --   --  1.70  --   --    ALBUMIN 3.7 3.2*  --   --   --   --    ALKPHOS 75 65  --   --   --   --    ALT 16 14  --   --   --   --    AST 19 16  --   --   --   --    BILITOT 0.8 0.5  --   --   --   --     < > = values in this interval not displayed.          Microbiology Results (last 7 days)       Procedure Component Value Units Date/Time    Blood Culture [[id number]]  (Normal) Collected: 11/13/23 0837    Order Status: Completed Specimen: Blood from Arm, Right Updated: 11/17/23 1001     CULTURE, BLOOD ([facility]) No Growth At 96 Hours    Blood Culture [[id number]]  (Normal) Collected: 11/13/23 0837    Order Status: Completed Specimen: Blood from Arm, Left Updated: 11/17/23 1001     CULTURE, BLOOD ([facility]) No Growth At 96 Hours    Blood culture x two cultures. Draw prior to antibiotics. [[id number]]  (Normal) Collected: 11/10/23 1712    Order Status: Completed Specimen: Blood Updated: 11/15/23 2101     CULTURE, BLOOD ([facility]) No Growth at 5 days    Blood culture x two cultures. Draw prior to antibiotics. [[id number]]  (Abnormal)  (Susceptibility) Collected: 11/10/23 1712    Order Status: Completed Specimen: Blood Updated: 11/13/23 0651     CULTURE, BLOOD ([facility]) Staphylococcus hominis     GRAM STAIN Gram Positive Cocci, probable Staphylococcus      Seen in gram stain of broth only      2 of 2 bottles positive    BCID2 Panel [[id number]]  (Abnormal) Collected: 11/10/23 1712    Order Status: Completed Specimen: Blood Updated: 11/11/23 1340     CTX-M (ESBL ) N/A     IMP (Cabapenemase ) N/A     KPC resistance gene (Carbapenemase )  N/A     mcr-1 N/A     mecA ID N/A     Comment: Note: Antimicrobial resistance can occur via multiple mechanisms. A Not Detected result for antimicrobial resistance gene(s) does not indicate antimicrobial susceptibility. Subculturing is required for species identification and susceptibility testing of   isolates.        mecA/C and MREJ (MRSA) gene N/A     NDM (Carbapenemase ) N/A     OXA-48-like (Carbapenemase ) N/A     Denisse/B (VRE gene) N/A     VIM (Carbapenemase ) N/A     Enterococcus faecalis Not Detected     Enterococcus faecium Not Detected     Listeria monocytogenes Not Detected     Staphylococcus spp. Detected     Staphylococcus aureus Not Detected     Staphylococcus epidermidis Not Detected     Staphylococcus lugdunensis Not Detected     Streptococcus spp. Not Detected     Streptococcus agalactiae (Group B) Not Detected     Streptococcus pneumoniae Not Detected     Streptococcus pyogenes (Group A) Not Detected     Acinetobacter calcoaceticus/baumannii complex Not Detected     Bacteroides fragilis Not Detected     Enterobacterales Not Detected     Enterobacter cloacae complex Not Detected     Escherichia coli Not Detected     Klebsiella aerogenes Not Detected     Klebsiella oxytoca Not Detected     Klebsiella pneumoniae group Not Detected     Proteus spp. Not Detected     Salmonella spp. Not Detected     Serratia marcescens Not Detected     Haemophilus influenzae Not Detected     Neisseria meningitidis Not Detected     Pseudomonas aeruginosa Not Detected     Stenotrophomonas maltophilia Not Detected     Candida albicans Not Detected     Candida auris Not Detected     Candida glabrata Not Detected     Candida krusei Not Detected     Candida parapsilosis Not Detected     Candida tropicalis Not Detected     Cryptococcus neoformans/gattii Not Detected    Narrative:      The [employer] BCID2 Panel is a multiplexed nucleic acid test intended for the use with [employer]® 2.0 or [employer]  [employer]® [employer] for the simultaneous qualitative detection and identification of multiple bacterial and yeast nucleic acids and select genetic determinants associated with antimicrobial resistance.  The [employer] BCID2 Panel test is performed directly on blood culture samples identified as positive by a continuous monitoring blood culture system.  Results are intended to be interpreted in conjunction with Gram stain results.    Respiratory Culture [[id number]]     Order Status: Sent Specimen: Sputum, Expectorated              Radiology:  Echo    Left Ventricle: The left ventricle is normal in size. Normal wall   thickness. Normal wall motion. There is normal systolic function with a   visually estimated ejection fraction of 55 - 60%. Grade I diastolic   dysfunction.    Right Ventricle: Normal right ventricular cavity size. Systolic   function is borderline low. TAPSE is 1.48 cm.    Left Atrium: Left atrium is mildly dilated.    Aortic Valve: There is mild aortic valve sclerosis. There is mild   aortic regurgitation.    Mitral Valve: Mildly thickened leaflets. There is mild regurgitation.    Tricuspid Valve: There is mild regurgitation.      Scheduled Med:   acetylcysteine 100 mg/ml (10%)  4 mL Nebulization Once    amLODIPine  5 mg Oral QHS    aspirin  81 mg Oral Daily    benzonatate  200 mg Oral TID    budesonide  0.25 mg Nebulization Daily    citalopram  40 mg Oral QAM    docusate sodium  100 mg Oral BID    enoxparin  40 mg Subcutaneous Daily    furosemide  40 mg Oral QAM    guaiFENesin  600 mg Oral BID    levothyroxine  50 mcg Oral Before breakfast    losartan  100 mg Oral QAM    metoprolol tartrate  100 mg Oral QHS    pravastatin  40 mg Oral QHS    traZODone  200 mg Oral Nightly        Continuous Infusions:       PRN Meds:  sodium chloride 0.9%, acetaminophen, albuterol-ipratropium, clonazePAM, dextromethorphan-guaiFENesin  mg/5 ml, dextrose 10%, dextrose 10%, glucagon (human recombinant), glucose,  glucose, insulin aspart U-100, melatonin, polyethylene glycol, sodium chloride 0.9%       Assessment/Plan:   Community-acquired pneumonia, bilaterally   Acute hypoxemic respiratory failure   Bacteremia secondary to pneumonia   Essential hypertension  Hypokalemia  Type 2 diabetes mellitus  Hyperlipidemia  Coronary artery disease s/p PCI  Hypothyroidism     Continue weaning supplemental oxygen   -CT chest 11/10 showed areas of atelectasis versus developing infiltrate  Incentive spirometry, mucomyst x1 today   Completed course of vancomycin on 11/17 for Staphylococcus positive blood cultures.  Repeat blood cultures are now negative.    She does have significant degree of deconditioning.  May require placement upon discharge.  Otherwise chronic medical issues are stable    Thairy G Reyes, DO   11/17/2023     All diagnosis and differential diagnosis have been reviewed; assessment and plan has been documented; I have personally reviewed the labs and test results that are presently available; I have reviewed the patients medication list; I have reviewed the consulting providers response and recommendations. I have reviewed or attempted to review medical records based upon their availability    All of the patient's questions have been  addressed and answered. Patient's is agreeable to the above stated plan. I will continue to monitor closely and make adjustments to medical management as needed.  _____________________________________________________________________

## 2023-11-17 NOTE — PT/OT/SLP RE-EVAL
"Physical Therapy Re-Evaluation    Patient Name:  Narcisa Perales   MRN:  [id number]    Recommendations:     Discharge therapy intensity: Low Intensity Therapy   Discharge Equipment Recommendations: walker, rolling   Barriers to discharge: None    Assessment:     Narcisa Perales is a 83 y.o. female admitted with a medical diagnosis of Pneumonia of both lungs due to infectious organism.  She presents with the following impairments/functional limitations: weakness, impaired endurance, impaired functional mobility, impaired balance, decreased lower extremity function . Pt continues to demonstrate good ambulatory endurance; vitals stable on 3L oxygen with mild SOB.    Rehab Prognosis: Good; patient would benefit from acute skilled PT services to address these deficits and reach maximum level of function.    Recent Surgery: * No surgery found *      Plan:     During this hospitalization, patient to be seen 5 x/week to address the identified rehab impairments via gait training, therapeutic activities, therapeutic exercises, neuromuscular re-education and progress toward the following goals:    Plan of Care Expires:  12/10/23    Subjective     Chief Complaint: "I don't feel well enough to return home yet."  Patient/Family Comments/goals: Get stronger and get off oxygen  Pain/Comfort:  Pain Rating 1: 0/10    Patients cultural, spiritual, Orthodox conflicts given the current situation: no    Objective:     Communicated with RN prior to session.  Patient found up in chair with pulse ox (continuous), telemetry, oxygen  upon PT entry to room.    General Precautions: Standard, fall  Orthopedic Precautions:    Braces:    Respiratory Status: Nasal cannula, flow 3 L/min O2 90-92% with ax  Blood Pressure: NT      Exams:  RLE Strength: WFL  LLE Strength: WFL  Skin integrity: Visible skin intact      Functional Mobility:  Transfers:     Sit to Stand:  stand by assistance with no AD and rolling walker  Gait: 210ft with RW with standby " assistance, one occasion of CGA with turning. 1 standing rest break taken at 120ft. Vitals stable.      AM-PAC 6 CLICK MOBILITY  Total Score:19       Treatment & Education:  Performed sit to stands with no UE assistance and standing marches with BUE support after gait trial to improve transfers and foot clearance with gait.    Patient provided with verbal education education regarding safety precautions, PT POC, discharge recs.  Understanding was verbalized.     Patient left up in chair with all lines intact, call button in reach, and RN notified.    GOALS:   Multidisciplinary Problems       Physical Therapy Goals          Problem: Physical Therapy    Goal Priority Disciplines Outcome Goal Variances Interventions   Physical Therapy Goal     PT, PT/OT Ongoing, Progressing     Description: Goals to be met by: 12/10/23     Patient will increase functional independence with mobility by performin. Supine to sit with Stand-by Assistance- MET  2. Sit to supine with Stand-by Assistance- MET  3. Sit to stand transfer with Stand-by Assistance  4. Bed to chair transfer with Stand-by Assistance using Rolling Walker- MET  5. Gait  x 150 feet with Stand-by Assistance using Rolling Walker- MET  6. Pt independent with all bed mobility.  7. Pt performs functional transfers Mod I/Independent with least restrictive AD.  8. Pt ambulates Ind/Mod Ind > or = 300ft with least restrictive AD.                         History:     Past Medical History:   Diagnosis Date    Carpal tunnel syndrome     Hypertension        Past Surgical History:   Procedure Laterality Date    CARPAL TUNNEL RELEASE      HYSTERECTOMY      JOINT REPLACEMENT      left TKA    rotator cuff Left     TONSILLECTOMY         Time Tracking:     PT Received On: 23  PT Start Time: 1101     PT Stop Time: 1132  PT Total Time (min): 31 min     Billable Minutes: Re-eval 21 and Therapeutic Activity 10      2023

## 2023-11-17 NOTE — PROGRESS NOTES
Pharmacokinetic Assessment Follow Up: IV Vancomycin    Vancomycin serum concentration assessment(s):    The trough level was drawn correctly and can be used to guide therapy at this time. The measurement is within the desired definitive target range of 15 to 20 mcg/mL.    Vancomycin Regimen Plan:    Continue regimen to Vancomycin 1500 mg IV every 24 hours with next serum trough concentration measured at 0500 prior to 4th dose on 11/20    Drug levels (last 3 results):  Recent Labs   Lab Result Units 11/17/23  0315   Vancomycin Trough ug/ml 16.3       Pharmacy will continue to follow and monitor vancomycin.    Please contact pharmacy at extension [phone] for questions regarding this assessment.    Thank you for the consult,   Jose Carlos Ferris       Patient brief summary:  Narcisa Perales is a 83 y.o. female initiated on antimicrobial therapy with IV Vancomycin for treatment of bacteremia    The patient's current regimen is 1500mg q24h    Drug Allergies:   Review of patient's allergies indicates:   Allergen Reactions    [place] [serum base no.214 (bulk)]     Sulfa (sulfonamide antibiotics)        Actual Body Weight:   77.1kg    Renal Function:   Estimated Creatinine Clearance: 54.7 mL/min (based on SCr of 0.74 mg/dL).,     Dialysis Method (if applicable):  N/A    CBC (last 72 hours):  Recent Labs   Lab Result Units 11/15/23  0453 11/16/23  0424   WBC x10(3)/mcL 5.05 4.09*   Hgb g/dL 12.0 12.0   Hct % 36.5* 36.7*   Platelet x10(3)/mcL 198 211   Mono % %  --  11.2   Eos % %  --  1.0   Basophil % %  --  0.2       Metabolic Panel (last 72 hours):  Recent Labs   Lab Result Units 11/15/23  0453 11/16/23  0424   Sodium Level mmol/L 136 136   Potassium Level mmol/L 3.8 3.3*   Chloride mmol/L 95* 94*   Carbon Dioxide mmol/L 35* 38*   Glucose Level mg/dL 178* 110   Blood Urea Nitrogen mg/dL 14.3 14.2   Creatinine mg/dL 0.80 0.74       Vancomycin Administrations:  vancomycin given in the last 96 hours                     vancomycin 1.5  g in dextrose 5 % 250 mL IVPB (ready to mix) (mg) 1,500 mg New Bag 11/17/23 0525     1,500 mg New Bag 11/16/23 0452      Restarted 11/15/23 0339     1,500 mg New Bag  0257    vancomycin in dextrose 5 % 1 gram/250 mL IVPB 1,000 mg (mg) 1,000 mg New Bag 11/14/23 0929     1,000 mg New Bag 11/13/23 2125                    Microbiologic Results:  Microbiology Results (last 7 days)       Procedure Component Value Units Date/Time    Blood Culture [[id number]]  (Normal) Collected: 11/13/23 0837    Order Status: Completed Specimen: Blood from Arm, Right Updated: 11/17/23 1001     CULTURE, BLOOD ([facility]) No Growth At 96 Hours    Blood Culture [[id number]]  (Normal) Collected: 11/13/23 0837    Order Status: Completed Specimen: Blood from Arm, Left Updated: 11/17/23 1001     CULTURE, BLOOD ([facility]) No Growth At 96 Hours    Blood culture x two cultures. Draw prior to antibiotics. [[id number]]  (Normal) Collected: 11/10/23 1712    Order Status: Completed Specimen: Blood Updated: 11/15/23 2101     CULTURE, BLOOD ([facility]) No Growth at 5 days    Blood culture x two cultures. Draw prior to antibiotics. [[id number]]  (Abnormal)  (Susceptibility) Collected: 11/10/23 1712    Order Status: Completed Specimen: Blood Updated: 11/13/23 0651     CULTURE, BLOOD ([facility]) Staphylococcus hominis     GRAM STAIN Gram Positive Cocci, probable Staphylococcus      Seen in gram stain of broth only      2 of 2 bottles positive    BCID2 Panel [[id number]]  (Abnormal) Collected: 11/10/23 1712    Order Status: Completed Specimen: Blood Updated: 11/11/23 1340     CTX-M (ESBL ) N/A     IMP (Cabapenemase ) N/A     KPC resistance gene (Carbapenemase ) N/A     mcr-1 N/A     mecA ID N/A     Comment: Note: Antimicrobial resistance can occur via multiple mechanisms. A Not Detected result for antimicrobial resistance gene(s) does not indicate antimicrobial susceptibility. Subculturing is required for species identification and susceptibility testing of    isolates.        mecA/C and MREJ (MRSA) gene N/A     NDM (Carbapenemase ) N/A     OXA-48-like (Carbapenemase ) N/A     Denisse/B (VRE gene) N/A     VIM (Carbapenemase ) N/A     Enterococcus faecalis Not Detected     Enterococcus faecium Not Detected     Listeria monocytogenes Not Detected     Staphylococcus spp. Detected     Staphylococcus aureus Not Detected     Staphylococcus epidermidis Not Detected     Staphylococcus lugdunensis Not Detected     Streptococcus spp. Not Detected     Streptococcus agalactiae (Group B) Not Detected     Streptococcus pneumoniae Not Detected     Streptococcus pyogenes (Group A) Not Detected     Acinetobacter calcoaceticus/baumannii complex Not Detected     Bacteroides fragilis Not Detected     Enterobacterales Not Detected     Enterobacter cloacae complex Not Detected     Escherichia coli Not Detected     Klebsiella aerogenes Not Detected     Klebsiella oxytoca Not Detected     Klebsiella pneumoniae group Not Detected     Proteus spp. Not Detected     Salmonella spp. Not Detected     Serratia marcescens Not Detected     Haemophilus influenzae Not Detected     Neisseria meningitidis Not Detected     Pseudomonas aeruginosa Not Detected     Stenotrophomonas maltophilia Not Detected     Candida albicans Not Detected     Candida auris Not Detected     Candida glabrata Not Detected     Candida krusei Not Detected     Candida parapsilosis Not Detected     Candida tropicalis Not Detected     Cryptococcus neoformans/gattii Not Detected    Narrative:      The [employer] BCID2 Panel is a multiplexed nucleic acid test intended for the use with [employer]® 2.0 or [employer]® [employer] for the simultaneous qualitative detection and identification of multiple bacterial and yeast nucleic acids and select genetic determinants associated with antimicrobial resistance.  The [employer] BCID2 Panel test is performed directly on blood culture samples identified as  positive by a continuous monitoring blood culture system.  Results are intended to be interpreted in conjunction with Gram stain results.    Respiratory Culture [[id number]]     Order Status: Sent Specimen: Sputum, Expectorated

## 2023-11-18 LAB
ANION GAP SERPL CALC-SCNC: 4 MEQ/L
BACTERIA BLD CULT: NORMAL
BACTERIA BLD CULT: NORMAL
BUN SERPL-MCNC: 14.1 MG/DL (ref 9.8–20.1)
CALCIUM SERPL-MCNC: 8.6 MG/DL (ref 8.4–10.2)
CHLORIDE SERPL-SCNC: 100 MMOL/L (ref 98–107)
CO2 SERPL-SCNC: 34 MMOL/L (ref 23–31)
CREAT SERPL-MCNC: 0.79 MG/DL (ref 0.55–1.02)
CREAT/UREA NIT SERPL: 18
GFR SERPLBLD CREATININE-BSD FMLA CKD-EPI: >60 MLS/MIN/1.73/M2
GLUCOSE SERPL-MCNC: 123 MG/DL (ref 82–115)
POCT GLUCOSE: 104 MG/DL (ref 70–110)
POCT GLUCOSE: 128 MG/DL (ref 70–110)
POCT GLUCOSE: 144 MG/DL (ref 70–110)
POTASSIUM SERPL-SCNC: 3.6 MMOL/L (ref 3.5–5.1)
SODIUM SERPL-SCNC: 138 MMOL/L (ref 136–145)

## 2023-11-18 PROCEDURE — 94640 AIRWAY INHALATION TREATMENT: CPT

## 2023-11-18 PROCEDURE — 97116 GAIT TRAINING THERAPY: CPT | Mod: CQ

## 2023-11-18 PROCEDURE — 21400001 HC TELEMETRY ROOM

## 2023-11-18 PROCEDURE — 94761 N-INVAS EAR/PLS OXIMETRY MLT: CPT

## 2023-11-18 PROCEDURE — 99900035 HC TECH TIME PER 15 MIN (STAT)

## 2023-11-18 PROCEDURE — 25000003 PHARM REV CODE 250: Performed by: STUDENT IN AN ORGANIZED HEALTH CARE EDUCATION/TRAINING PROGRAM

## 2023-11-18 PROCEDURE — 25000003 PHARM REV CODE 250: Performed by: NURSE PRACTITIONER

## 2023-11-18 PROCEDURE — 27000221 HC OXYGEN, UP TO 24 HOURS

## 2023-11-18 PROCEDURE — 63600175 PHARM REV CODE 636 W HCPCS: Performed by: INTERNAL MEDICINE

## 2023-11-18 PROCEDURE — 80048 BASIC METABOLIC PNL TOTAL CA: CPT | Performed by: STUDENT IN AN ORGANIZED HEALTH CARE EDUCATION/TRAINING PROGRAM

## 2023-11-18 PROCEDURE — 25000242 PHARM REV CODE 250 ALT 637 W/ HCPCS: Performed by: INTERNAL MEDICINE

## 2023-11-18 PROCEDURE — 25000003 PHARM REV CODE 250: Performed by: INTERNAL MEDICINE

## 2023-11-18 RX ADMIN — GUAIFENESIN AND DEXTROMETHORPHAN 5 ML: 100; 10 SYRUP ORAL at 09:11

## 2023-11-18 RX ADMIN — GUAIFENESIN 600 MG: 600 TABLET, EXTENDED RELEASE ORAL at 09:11

## 2023-11-18 RX ADMIN — Medication 6 MG: at 09:11

## 2023-11-18 RX ADMIN — AMLODIPINE BESYLATE 5 MG: 5 TABLET ORAL at 09:11

## 2023-11-18 RX ADMIN — FUROSEMIDE 40 MG: 40 TABLET ORAL at 06:11

## 2023-11-18 RX ADMIN — DOCUSATE SODIUM 100 MG: 100 CAPSULE, LIQUID FILLED ORAL at 09:11

## 2023-11-18 RX ADMIN — CITALOPRAM HYDROBROMIDE 40 MG: 20 TABLET ORAL at 06:11

## 2023-11-18 RX ADMIN — BENZONATATE 200 MG: 100 CAPSULE ORAL at 09:11

## 2023-11-18 RX ADMIN — BENZONATATE 200 MG: 100 CAPSULE ORAL at 03:11

## 2023-11-18 RX ADMIN — BUDESONIDE INHALATION 0.25 MG: 0.5 SUSPENSION RESPIRATORY (INHALATION) at 08:11

## 2023-11-18 RX ADMIN — METOPROLOL TARTRATE 100 MG: 50 TABLET, FILM COATED ORAL at 09:11

## 2023-11-18 RX ADMIN — ACETAMINOPHEN 650 MG: 325 TABLET, FILM COATED ORAL at 09:11

## 2023-11-18 RX ADMIN — LOSARTAN POTASSIUM 100 MG: 50 TABLET, FILM COATED ORAL at 06:11

## 2023-11-18 RX ADMIN — ENOXAPARIN SODIUM 40 MG: 40 INJECTION SUBCUTANEOUS at 09:11

## 2023-11-18 RX ADMIN — LEVOTHYROXINE SODIUM 50 MCG: 50 TABLET ORAL at 06:11

## 2023-11-18 RX ADMIN — PRAVASTATIN SODIUM 40 MG: 40 TABLET ORAL at 09:11

## 2023-11-18 RX ADMIN — CLONAZEPAM 1 MG: 1 TABLET ORAL at 09:11

## 2023-11-18 RX ADMIN — DOCUSATE SODIUM 100 MG: 100 CAPSULE, LIQUID FILLED ORAL at 08:11

## 2023-11-18 RX ADMIN — BENZONATATE 200 MG: 100 CAPSULE ORAL at 08:11

## 2023-11-18 RX ADMIN — TRAZODONE HYDROCHLORIDE 200 MG: 100 TABLET ORAL at 09:11

## 2023-11-18 RX ADMIN — ASPIRIN 81 MG: 81 TABLET, COATED ORAL at 08:11

## 2023-11-18 RX ADMIN — GUAIFENESIN 600 MG: 600 TABLET, EXTENDED RELEASE ORAL at 08:11

## 2023-11-18 NOTE — PROGRESS NOTES
[facility] Hospital Medicine Progress Note        Chief Complaint: Inpatient Follow-up     HPI:    83-year-old female with past medical history as below who has had progressive dyspnea over the last several days.  She denied fever or purulent sputum production.  She went to an urgent care today where she was found to be satting 88% on room air and was referred to the ER for further evaluation.  Here she arrived satting in the mid 90s on 2 L nasal cannula but afebrile and hemodynamically stable.  Laboratory work was unremarkable.  CT of the chest however showed left lower lobe and lingula and right middle lobe areas of infiltrate consistent with multifocal pneumonia.  Blood cultures were obtained and she was started on broad-spectrum antibiotics admitted to the hospitalist service for further management.  Was put on OxyMask and was weaned off currently on nasal cannula.  Blood cultures 2/2 positive for Staphylococcus hormone knee.  Susceptible to vancomycin.  Respiratory culture and urine cultures have been negative.        Interval Hx:  No significant changes overnight.  Resting comfortably in bed.  On 3 L nasal cannula.  Continues to use her incentive spirometer.  Still with some cough.  Otherwise stable    Objective/physical exam:  General: In no acute distress, afebrile  Chest: Clear to auscultation bilaterally  Heart: RRR, +S1, S2, no appreciable murmur  Abdomen: Soft, nontender, BS +  MSK: Warm, no lower extremity edema, no clubbing or cyanosis  Neurologic: Alert and oriented x4, Cranial nerve II-XII intact, Strength 5/5 in all 4 extremities    VITAL SIGNS: 24 HRS MIN & MAX LAST   Temp  Min: 98 °F (36.7 °C)  Max: 99.2 °F (37.3 °C) 98.2 °F (36.8 °C)   BP  Min: 104/62  Max: 139/75 119/67   Pulse  Min: 53  Max: 97  (!) 53   Resp  Min: 16  Max: 21 18   SpO2  Min: 93 %  Max: 97 % 97 %       Recent Labs   Lab 11/13/23  0330 11/15/23  0453 11/16/23  0424   WBC 4.90 5.05 4.09*   RBC 4.33  4.26 4.23   HGB 12.3 12.0 12.0   HCT 37.4 36.5* 36.7*   MCV 86.4 85.7 86.8   MCH 28.4 28.2 28.4   MCHC 32.9* 32.9* 32.7*   RDW 12.9 13.2 13.2    198 211   MPV 9.3 9.1 8.9       Recent Labs   Lab 11/14/23  0410 11/15/23  0453 11/16/23  0424 11/18/23  0426   * 136 136 138   K 3.7 3.8 3.3* 3.6   CO2 30 35* 38* 34*   BUN 15.5 14.3 14.2 14.1   CREATININE 0.81 0.80 0.74 0.79   CALCIUM 8.8 8.4 8.3* 8.6   MG 1.70  --   --   --           Microbiology Results (last 7 days)       Procedure Component Value Units Date/Time    Blood Culture [[id number]]  (Normal) Collected: 11/13/23 0837    Order Status: Completed Specimen: Blood from Arm, Right Updated: 11/17/23 1001     CULTURE, BLOOD ([facility]) No Growth At 96 Hours    Blood Culture [[id number]]  (Normal) Collected: 11/13/23 0837    Order Status: Completed Specimen: Blood from Arm, Left Updated: 11/17/23 1001     CULTURE, BLOOD ([facility]) No Growth At 96 Hours    Blood culture x two cultures. Draw prior to antibiotics. [[id number]]  (Normal) Collected: 11/10/23 1712    Order Status: Completed Specimen: Blood Updated: 11/15/23 2101     CULTURE, BLOOD ([facility]) No Growth at 5 days    Blood culture x two cultures. Draw prior to antibiotics. [[id number]]  (Abnormal)  (Susceptibility) Collected: 11/10/23 1712    Order Status: Completed Specimen: Blood Updated: 11/13/23 0651     CULTURE, BLOOD ([facility]) Staphylococcus hominis     GRAM STAIN Gram Positive Cocci, probable Staphylococcus      Seen in gram stain of broth only      2 of 2 bottles positive    BCID2 Panel [[id number]]  (Abnormal) Collected: 11/10/23 1712    Order Status: Completed Specimen: Blood Updated: 11/11/23 1340     CTX-M (ESBL ) N/A     IMP (Cabapenemase ) N/A     KPC resistance gene (Carbapenemase ) N/A     mcr-1 N/A     mecA ID N/A     Comment: Note: Antimicrobial resistance can occur via multiple mechanisms. A Not Detected result for antimicrobial resistance gene(s) does not indicate  antimicrobial susceptibility. Subculturing is required for species identification and susceptibility testing of   isolates.        mecA/C and MREJ (MRSA) gene N/A     NDM (Carbapenemase ) N/A     OXA-48-like (Carbapenemase ) N/A     Denisse/B (VRE gene) N/A     VIM (Carbapenemase ) N/A     Enterococcus faecalis Not Detected     Enterococcus faecium Not Detected     Listeria monocytogenes Not Detected     Staphylococcus spp. Detected     Staphylococcus aureus Not Detected     Staphylococcus epidermidis Not Detected     Staphylococcus lugdunensis Not Detected     Streptococcus spp. Not Detected     Streptococcus agalactiae (Group B) Not Detected     Streptococcus pneumoniae Not Detected     Streptococcus pyogenes (Group A) Not Detected     Acinetobacter calcoaceticus/baumannii complex Not Detected     Bacteroides fragilis Not Detected     Enterobacterales Not Detected     Enterobacter cloacae complex Not Detected     Escherichia coli Not Detected     Klebsiella aerogenes Not Detected     Klebsiella oxytoca Not Detected     Klebsiella pneumoniae group Not Detected     Proteus spp. Not Detected     Salmonella spp. Not Detected     Serratia marcescens Not Detected     Haemophilus influenzae Not Detected     Neisseria meningitidis Not Detected     Pseudomonas aeruginosa Not Detected     Stenotrophomonas maltophilia Not Detected     Candida albicans Not Detected     Candida auris Not Detected     Candida glabrata Not Detected     Candida krusei Not Detected     Candida parapsilosis Not Detected     Candida tropicalis Not Detected     Cryptococcus neoformans/gattii Not Detected    Narrative:      The [employer] BCID2 Panel is a multiplexed nucleic acid test intended for the use with [employer]® 2.0 or [employer]® [employer] for the simultaneous qualitative detection and identification of multiple bacterial and yeast nucleic acids and select genetic determinants associated with  antimicrobial resistance.  The [employer] BCID2 Panel test is performed directly on blood culture samples identified as positive by a continuous monitoring blood culture system.  Results are intended to be interpreted in conjunction with Gram stain results.             Radiology:  Echo    Left Ventricle: The left ventricle is normal in size. Normal wall   thickness. Normal wall motion. There is normal systolic function with a   visually estimated ejection fraction of 55 - 60%. Grade I diastolic   dysfunction.    Right Ventricle: Normal right ventricular cavity size. Systolic   function is borderline low. TAPSE is 1.48 cm.    Left Atrium: Left atrium is mildly dilated.    Aortic Valve: There is mild aortic valve sclerosis. There is mild   aortic regurgitation.    Mitral Valve: Mildly thickened leaflets. There is mild regurgitation.    Tricuspid Valve: There is mild regurgitation.      Scheduled Med:   amLODIPine  5 mg Oral QHS    aspirin  81 mg Oral Daily    benzonatate  200 mg Oral TID    budesonide  0.25 mg Nebulization Daily    citalopram  40 mg Oral QAM    docusate sodium  100 mg Oral BID    enoxparin  40 mg Subcutaneous Daily    furosemide  40 mg Oral QAM    guaiFENesin  600 mg Oral BID    levothyroxine  50 mcg Oral Before breakfast    losartan  100 mg Oral QAM    metoprolol tartrate  100 mg Oral QHS    pravastatin  40 mg Oral QHS    traZODone  200 mg Oral Nightly        Continuous Infusions:       PRN Meds:  sodium chloride 0.9%, acetaminophen, albuterol-ipratropium, clonazePAM, dextromethorphan-guaiFENesin  mg/5 ml, dextrose 10%, dextrose 10%, glucagon (human recombinant), glucose, glucose, insulin aspart U-100, melatonin, polyethylene glycol, sodium chloride 0.9%       Assessment/Plan:   Community-acquired pneumonia, bilaterally   Acute hypoxemic respiratory failure   Bacteremia secondary to pneumonia   Essential hypertension  Hypokalemia  Type 2 diabetes mellitus  Hyperlipidemia  Coronary artery disease  s/p PCI  Hypothyroidism    Patient was now completed antibiotics for pneumonia.    Still with some hypoxemia but we are slowly able to trend her supplemental oxygen down.  Encouraged her to continue using her incentive spirometer and mobilizing with therapy.    Case management is setting her up with home health upon discharge.  Will see how she does over the weekend but may end up requiring some supplemental O2 if we can not wean her further.    Otherwise chronic medical issues are stable.  Will plan to discharge once her oxygenation is little more stable      Chriss Canales MD   11/18/2023     All diagnosis and differential diagnosis have been reviewed; assessment and plan has been documented; I have personally reviewed the labs and test results that are presently available; I have reviewed the patients medication list; I have reviewed the consulting providers response and recommendations. I have reviewed or attempted to review medical records based upon their availability    All of the patient's questions have been  addressed and answered. Patient's is agreeable to the above stated plan. I will continue to monitor closely and make adjustments to medical management as needed.  _____________________________________________________________________

## 2023-11-18 NOTE — PT/OT/SLP PROGRESS
Physical Therapy Treatment    Patient Name:  Narcisa Perales   MRN:  [id number]    Recommendations:     Discharge Recommendations: Low Intensity Therapy  Discharge Equipment Recommendations: walker, rolling  Barriers to discharge: None    Assessment:     Narcisa Perales is a 83 y.o. female admitted with a medical diagnosis of Pneumonia of both lungs due to infectious organism.  She presents with the following impairments/functional limitations: weakness, impaired endurance, impaired functional mobility, impaired balance, decreased lower extremity function .    Rehab Prognosis: Good; patient would benefit from acute skilled PT services to address these deficits and reach maximum level of function.    Recent Surgery: * No surgery found *      Plan:     During this hospitalization, patient to be seen 5 x/week to address the identified rehab impairments via gait training, therapeutic activities, therapeutic exercises, neuromuscular re-education and progress toward the following goals:    Plan of Care Expires:  12/10/23          Objective:     Communicated with nsg prior to session.  Patient found up in chair with   upon PT entry to room.     General Precautions: Standard, fall  Orthopedic Precautions:    Braces:    Respiratory Status: Room air     Functional Mobility:  Pt supine to sitting EOB with SBA, pt amb with HHA to the bathroom to void with HHA, pt amb for appx 400 ft with HHA with two standng rest breaks, pt sat UIC and completed B LE therex in sitting for 20 reps.       AM-PAC 6 CLICK MOBILITY          Treatment & Education:      Patient left up in chair with call button in reach..    GOALS:   Multidisciplinary Problems       Physical Therapy Goals          Problem: Physical Therapy    Goal Priority Disciplines Outcome Goal Variances Interventions   Physical Therapy Goal     PT, PT/OT Ongoing, Progressing     Description: Goals to be met by: 12/10/23     Patient will increase functional independence with  mobility by performin. Supine to sit with Stand-by Assistance- MET  2. Sit to supine with Stand-by Assistance- MET  3. Sit to stand transfer with Stand-by Assistance  4. Bed to chair transfer with Stand-by Assistance using Rolling Walker- MET  5. Gait  x 150 feet with Stand-by Assistance using Rolling Walker- MET  6. Pt independent with all bed mobility.  7. Pt performs functional transfers Mod I/Independent with least restrictive AD.  8. Pt ambulates Ind/Mod Ind > or = 300ft with least restrictive AD.                         Time Tracking:     PT Received On:    PT Start Time:       PT Stop Time:    PT Total Time (min):       Billable Minutes: Gait Training 25       PT/PTA: PTA     Number of PTA visits since last PT visit: 2023

## 2023-11-19 LAB — POCT GLUCOSE: 107 MG/DL (ref 70–110)

## 2023-11-19 PROCEDURE — 21400001 HC TELEMETRY ROOM

## 2023-11-19 PROCEDURE — 25000003 PHARM REV CODE 250: Performed by: INTERNAL MEDICINE

## 2023-11-19 PROCEDURE — 25000003 PHARM REV CODE 250: Performed by: STUDENT IN AN ORGANIZED HEALTH CARE EDUCATION/TRAINING PROGRAM

## 2023-11-19 PROCEDURE — 25000003 PHARM REV CODE 250: Performed by: NURSE PRACTITIONER

## 2023-11-19 PROCEDURE — 94640 AIRWAY INHALATION TREATMENT: CPT

## 2023-11-19 PROCEDURE — 94761 N-INVAS EAR/PLS OXIMETRY MLT: CPT

## 2023-11-19 PROCEDURE — 99900035 HC TECH TIME PER 15 MIN (STAT)

## 2023-11-19 PROCEDURE — 25000242 PHARM REV CODE 250 ALT 637 W/ HCPCS: Performed by: INTERNAL MEDICINE

## 2023-11-19 PROCEDURE — 27000221 HC OXYGEN, UP TO 24 HOURS

## 2023-11-19 PROCEDURE — 63600175 PHARM REV CODE 636 W HCPCS: Performed by: INTERNAL MEDICINE

## 2023-11-19 RX ADMIN — TRAZODONE HYDROCHLORIDE 200 MG: 100 TABLET ORAL at 08:11

## 2023-11-19 RX ADMIN — GUAIFENESIN AND DEXTROMETHORPHAN 5 ML: 100; 10 SYRUP ORAL at 06:11

## 2023-11-19 RX ADMIN — Medication 6 MG: at 08:11

## 2023-11-19 RX ADMIN — ENOXAPARIN SODIUM 40 MG: 40 INJECTION SUBCUTANEOUS at 08:11

## 2023-11-19 RX ADMIN — DOCUSATE SODIUM 100 MG: 100 CAPSULE, LIQUID FILLED ORAL at 08:11

## 2023-11-19 RX ADMIN — LEVOTHYROXINE SODIUM 50 MCG: 50 TABLET ORAL at 05:11

## 2023-11-19 RX ADMIN — PRAVASTATIN SODIUM 40 MG: 40 TABLET ORAL at 08:11

## 2023-11-19 RX ADMIN — BENZONATATE 200 MG: 100 CAPSULE ORAL at 04:11

## 2023-11-19 RX ADMIN — ACETAMINOPHEN 650 MG: 325 TABLET, FILM COATED ORAL at 08:11

## 2023-11-19 RX ADMIN — ASPIRIN 81 MG: 81 TABLET, COATED ORAL at 08:11

## 2023-11-19 RX ADMIN — IPRATROPIUM BROMIDE AND ALBUTEROL SULFATE 3 ML: 2.5; .5 SOLUTION RESPIRATORY (INHALATION) at 07:11

## 2023-11-19 RX ADMIN — METOPROLOL TARTRATE 100 MG: 50 TABLET, FILM COATED ORAL at 08:11

## 2023-11-19 RX ADMIN — BENZONATATE 200 MG: 100 CAPSULE ORAL at 08:11

## 2023-11-19 RX ADMIN — LOSARTAN POTASSIUM 100 MG: 50 TABLET, FILM COATED ORAL at 06:11

## 2023-11-19 RX ADMIN — FUROSEMIDE 40 MG: 40 TABLET ORAL at 06:11

## 2023-11-19 RX ADMIN — CLONAZEPAM 1 MG: 1 TABLET ORAL at 08:11

## 2023-11-19 RX ADMIN — GUAIFENESIN 600 MG: 600 TABLET, EXTENDED RELEASE ORAL at 08:11

## 2023-11-19 RX ADMIN — POLYETHYLENE GLYCOL 3350 17 G: 17 POWDER, FOR SOLUTION ORAL at 11:11

## 2023-11-19 RX ADMIN — GUAIFENESIN AND DEXTROMETHORPHAN 5 ML: 100; 10 SYRUP ORAL at 12:11

## 2023-11-19 RX ADMIN — AMLODIPINE BESYLATE 5 MG: 5 TABLET ORAL at 08:11

## 2023-11-19 RX ADMIN — CITALOPRAM HYDROBROMIDE 40 MG: 20 TABLET ORAL at 06:11

## 2023-11-19 NOTE — PROGRESS NOTES
[facility] Hospital Medicine Progress Note        Chief Complaint: Inpatient Follow-up     HPI:   83-year-old female with past medical history as below who has had progressive dyspnea over the last several days.  She denied fever or purulent sputum production.  She went to an urgent care today where she was found to be satting 88% on room air and was referred to the ER for further evaluation.  Here she arrived satting in the mid 90s on 2 L nasal cannula but afebrile and hemodynamically stable.  Laboratory work was unremarkable.  CT of the chest however showed left lower lobe and lingula and right middle lobe areas of infiltrate consistent with multifocal pneumonia.  Blood cultures were obtained and she was started on broad-spectrum antibiotics admitted to the hospitalist service for further management.  Was put on OxyMask and was weaned off currently on nasal cannula.  Blood cultures 2/2 positive for Staphylococcus hormone knee.  Susceptible to vancomycin.  Respiratory culture and urine cultures have been negative.      Interval Hx:  Continues to make some progress in her oxygenation.  Now on 2 L nasal cannula.  Slowly making improvement.  Did well with therapy yesterday.  The recommending home health services.  If we can not wean her completely off oxygen by tomorrow discussed getting case management to help set up for outpatient oxygen supplementation.    Objective/physical exam:  General: In no acute distress, afebrile  Chest: Clear to auscultation bilaterally  Heart: RRR, +S1, S2, no appreciable murmur  Abdomen: Soft, nontender, BS +  MSK: Warm, no lower extremity edema, no clubbing or cyanosis  Neurologic: Alert and oriented x4, Cranial nerve II-XII intact, Strength 5/5 in all 4 extremities    VITAL SIGNS: 24 HRS MIN & MAX LAST   Temp  Min: 97.9 °F (36.6 °C)  Max: 98.2 °F (36.8 °C) 98.1 °F (36.7 °C)   BP  Min: 124/72  Max: 145/81 (!) 132/58   Pulse  Min: 62  Max: 79  62   Resp   Min: 18  Max: 20 18   SpO2  Min: 83 %  Max: 98 % (!) 94 %       Recent Labs   Lab 11/13/23  0330 11/15/23  0453 11/16/23 0424   WBC 4.90 5.05 4.09*   RBC 4.33 4.26 4.23   HGB 12.3 12.0 12.0   HCT 37.4 36.5* 36.7*   MCV 86.4 85.7 86.8   MCH 28.4 28.2 28.4   MCHC 32.9* 32.9* 32.7*   RDW 12.9 13.2 13.2    198 211   MPV 9.3 9.1 8.9       Recent Labs   Lab 11/14/23  0410 11/15/23  0453 11/16/23  0424 11/18/23  0426   * 136 136 138   K 3.7 3.8 3.3* 3.6   CO2 30 35* 38* 34*   BUN 15.5 14.3 14.2 14.1   CREATININE 0.81 0.80 0.74 0.79   CALCIUM 8.8 8.4 8.3* 8.6   MG 1.70  --   --   --           Microbiology Results (last 7 days)       Procedure Component Value Units Date/Time    Blood Culture [[id number]]  (Normal) Collected: 11/13/23 0837    Order Status: Completed Specimen: Blood from Arm, Right Updated: 11/18/23 1001     CULTURE, BLOOD ([facility]) No Growth at 5 days    Blood Culture [[id number]]  (Normal) Collected: 11/13/23 0837    Order Status: Completed Specimen: Blood from Arm, Left Updated: 11/18/23 1001     CULTURE, BLOOD ([facility]) No Growth at 5 days    Blood culture x two cultures. Draw prior to antibiotics. [[id number]]  (Normal) Collected: 11/10/23 1712    Order Status: Completed Specimen: Blood Updated: 11/15/23 2101     CULTURE, BLOOD ([facility]) No Growth at 5 days    Blood culture x two cultures. Draw prior to antibiotics. [[id number]]  (Abnormal)  (Susceptibility) Collected: 11/10/23 1712    Order Status: Completed Specimen: Blood Updated: 11/13/23 0651     CULTURE, BLOOD ([facility]) Staphylococcus hominis     GRAM STAIN Gram Positive Cocci, probable Staphylococcus      Seen in gram stain of broth only      2 of 2 bottles positive             Radiology:  Echo    Left Ventricle: The left ventricle is normal in size. Normal wall   thickness. Normal wall motion. There is normal systolic function with a   visually estimated ejection fraction of 55 - 60%. Grade I diastolic   dysfunction.    Right Ventricle: Normal right  ventricular cavity size. Systolic   function is borderline low. TAPSE is 1.48 cm.    Left Atrium: Left atrium is mildly dilated.    Aortic Valve: There is mild aortic valve sclerosis. There is mild   aortic regurgitation.    Mitral Valve: Mildly thickened leaflets. There is mild regurgitation.    Tricuspid Valve: There is mild regurgitation.      Scheduled Med:   amLODIPine  5 mg Oral QHS    aspirin  81 mg Oral Daily    benzonatate  200 mg Oral TID    budesonide  0.25 mg Nebulization Daily    citalopram  40 mg Oral QAM    docusate sodium  100 mg Oral BID    enoxparin  40 mg Subcutaneous Daily    furosemide  40 mg Oral QAM    guaiFENesin  600 mg Oral BID    levothyroxine  50 mcg Oral Before breakfast    losartan  100 mg Oral QAM    metoprolol tartrate  100 mg Oral QHS    pravastatin  40 mg Oral QHS    traZODone  200 mg Oral Nightly        Continuous Infusions:       PRN Meds:  sodium chloride 0.9%, acetaminophen, albuterol-ipratropium, clonazePAM, dextromethorphan-guaiFENesin  mg/5 ml, dextrose 10%, dextrose 10%, glucagon (human recombinant), glucose, glucose, insulin aspart U-100, melatonin, polyethylene glycol, sodium chloride 0.9%       Assessment/Plan:   Community-acquired pneumonia, bilaterally   Acute hypoxemic respiratory failure   Bacteremia secondary to pneumonia   Essential hypertension  Hypokalemia  Type 2 diabetes mellitus  Hyperlipidemia  Coronary artery disease s/p PCI  Hypothyroidism     Patient was completed all antibiotics and oxygenation continues to improve though slowly.  Currently on 2 L nasal cannula.  Worked with therapy yesterday.  Will give her another 24 hours to see if we can continue to make improvement.  If she remains on O2 tomorrow will ask case management to set up some home oxygen.  Continue incentive spirometer.  The more the better.  Otherwise medically stable.  Labs yesterday were good.  Repeat p.r.n..  Continue home blood pressure regimen.      Chriss Canales MD   11/19/2023      All diagnosis and differential diagnosis have been reviewed; assessment and plan has been documented; I have personally reviewed the labs and test results that are presently available; I have reviewed the patients medication list; I have reviewed the consulting providers response and recommendations. I have reviewed or attempted to review medical records based upon their availability    All of the patient's questions have been  addressed and answered. Patient's is agreeable to the above stated plan. I will continue to monitor closely and make adjustments to medical management as needed.  _____________________________________________________________________

## 2023-11-20 LAB
POCT GLUCOSE: 155 MG/DL (ref 70–110)
POCT GLUCOSE: 165 MG/DL (ref 70–110)

## 2023-11-20 PROCEDURE — 25000003 PHARM REV CODE 250: Performed by: NURSE PRACTITIONER

## 2023-11-20 PROCEDURE — 94640 AIRWAY INHALATION TREATMENT: CPT

## 2023-11-20 PROCEDURE — 97116 GAIT TRAINING THERAPY: CPT | Mod: CQ

## 2023-11-20 PROCEDURE — 99900035 HC TECH TIME PER 15 MIN (STAT)

## 2023-11-20 PROCEDURE — 94761 N-INVAS EAR/PLS OXIMETRY MLT: CPT

## 2023-11-20 PROCEDURE — 27000221 HC OXYGEN, UP TO 24 HOURS

## 2023-11-20 PROCEDURE — 25000003 PHARM REV CODE 250: Performed by: INTERNAL MEDICINE

## 2023-11-20 PROCEDURE — 25000242 PHARM REV CODE 250 ALT 637 W/ HCPCS: Performed by: INTERNAL MEDICINE

## 2023-11-20 PROCEDURE — 21400001 HC TELEMETRY ROOM

## 2023-11-20 PROCEDURE — 63600175 PHARM REV CODE 636 W HCPCS: Performed by: INTERNAL MEDICINE

## 2023-11-20 PROCEDURE — 25000003 PHARM REV CODE 250: Performed by: STUDENT IN AN ORGANIZED HEALTH CARE EDUCATION/TRAINING PROGRAM

## 2023-11-20 RX ADMIN — BENZONATATE 200 MG: 100 CAPSULE ORAL at 09:11

## 2023-11-20 RX ADMIN — DOCUSATE SODIUM 100 MG: 100 CAPSULE, LIQUID FILLED ORAL at 09:11

## 2023-11-20 RX ADMIN — GUAIFENESIN AND DEXTROMETHORPHAN 5 ML: 100; 10 SYRUP ORAL at 05:11

## 2023-11-20 RX ADMIN — ENOXAPARIN SODIUM 40 MG: 40 INJECTION SUBCUTANEOUS at 09:11

## 2023-11-20 RX ADMIN — DOCUSATE SODIUM 100 MG: 100 CAPSULE, LIQUID FILLED ORAL at 08:11

## 2023-11-20 RX ADMIN — INSULIN ASPART 2 UNITS: 100 INJECTION, SOLUTION INTRAVENOUS; SUBCUTANEOUS at 04:11

## 2023-11-20 RX ADMIN — GUAIFENESIN AND DEXTROMETHORPHAN 5 ML: 100; 10 SYRUP ORAL at 10:11

## 2023-11-20 RX ADMIN — GUAIFENESIN 600 MG: 600 TABLET, EXTENDED RELEASE ORAL at 08:11

## 2023-11-20 RX ADMIN — CITALOPRAM HYDROBROMIDE 40 MG: 20 TABLET ORAL at 06:11

## 2023-11-20 RX ADMIN — LOSARTAN POTASSIUM 100 MG: 50 TABLET, FILM COATED ORAL at 06:11

## 2023-11-20 RX ADMIN — METOPROLOL TARTRATE 100 MG: 50 TABLET, FILM COATED ORAL at 09:11

## 2023-11-20 RX ADMIN — AMLODIPINE BESYLATE 5 MG: 5 TABLET ORAL at 09:11

## 2023-11-20 RX ADMIN — GUAIFENESIN AND DEXTROMETHORPHAN 5 ML: 100; 10 SYRUP ORAL at 09:11

## 2023-11-20 RX ADMIN — BUDESONIDE INHALATION 0.25 MG: 0.5 SUSPENSION RESPIRATORY (INHALATION) at 11:11

## 2023-11-20 RX ADMIN — PRAVASTATIN SODIUM 40 MG: 40 TABLET ORAL at 09:11

## 2023-11-20 RX ADMIN — CLONAZEPAM 1 MG: 1 TABLET ORAL at 09:11

## 2023-11-20 RX ADMIN — FUROSEMIDE 40 MG: 40 TABLET ORAL at 06:11

## 2023-11-20 RX ADMIN — GUAIFENESIN 600 MG: 600 TABLET, EXTENDED RELEASE ORAL at 09:11

## 2023-11-20 RX ADMIN — BENZONATATE 200 MG: 100 CAPSULE ORAL at 02:11

## 2023-11-20 RX ADMIN — Medication 6 MG: at 09:11

## 2023-11-20 RX ADMIN — BENZONATATE 200 MG: 100 CAPSULE ORAL at 08:11

## 2023-11-20 RX ADMIN — TRAZODONE HYDROCHLORIDE 200 MG: 100 TABLET ORAL at 09:11

## 2023-11-20 RX ADMIN — ASPIRIN 81 MG: 81 TABLET, COATED ORAL at 08:11

## 2023-11-20 RX ADMIN — LEVOTHYROXINE SODIUM 50 MCG: 50 TABLET ORAL at 05:11

## 2023-11-20 NOTE — PROGRESS NOTES
[facility] Hospital Medicine Progress Note        Chief Complaint: Inpatient Follow-up     HPI:   83-year-old female with past medical history as below who has had progressive dyspnea over the last several days.  She denied fever or purulent sputum production.  She went to an urgent care today where she was found to be satting 88% on room air and was referred to the ER for further evaluation.  Here she arrived satting in the mid 90s on 2 L nasal cannula but afebrile and hemodynamically stable.  Laboratory work was unremarkable.  CT of the chest however showed left lower lobe and lingula and right middle lobe areas of infiltrate consistent with multifocal pneumonia.  Blood cultures were obtained and she was started on broad-spectrum antibiotics admitted to the hospitalist service for further management.  Was put on OxyMask and was weaned off currently on nasal cannula.  Blood cultures 2/2 positive for Staphylococcus hormone knee.  Susceptible to vancomycin.  Respiratory culture and urine cultures have been negative.      Interval Hx:  Patient down to 1 L the on nasal cannula this morning.  Oxygen saturation 97%.  I went ahead and took her off and will see how she does throughout the day today.  I discussed with her that like her off of oxygen for 24 hours and then potentially could go home tomorrow.  Like her to work with therapy again today.  Overall she was feeling better.  Still with significant cough but improving.  Continue incentive spirometer.     Objective/physical exam:  General: In no acute distress, afebrile  Chest: Clear to auscultation bilaterally  Heart: RRR, +S1, S2, no appreciable murmur  Abdomen: Soft, nontender, BS +  MSK: Warm, no lower extremity edema, no clubbing or cyanosis  Neurologic: Alert and oriented x4, Cranial nerve II-XII intact, Strength 5/5 in all 4 extremities    VITAL SIGNS: 24 HRS MIN & MAX LAST   Temp  Min: 98 °F (36.7 °C)  Max: 98.3 °F (36.8 °C) 98.1  °F (36.7 °C)   BP  Min: 107/68  Max: 150/72 (!) 113/54   Pulse  Min: 58  Max: 73  (!) 58   Resp  Min: 18  Max: 18 18   SpO2  Min: 92 %  Max: 98 % 96 %       Recent Labs   Lab 11/15/23  0453 11/16/23  0424   WBC 5.05 4.09*   RBC 4.26 4.23   HGB 12.0 12.0   HCT 36.5* 36.7*   MCV 85.7 86.8   MCH 28.2 28.4   MCHC 32.9* 32.7*   RDW 13.2 13.2    211   MPV 9.1 8.9       Recent Labs   Lab 11/14/23  0410 11/15/23  0453 11/16/23  0424 11/18/23  0426   * 136 136 138   K 3.7 3.8 3.3* 3.6   CO2 30 35* 38* 34*   BUN 15.5 14.3 14.2 14.1   CREATININE 0.81 0.80 0.74 0.79   CALCIUM 8.8 8.4 8.3* 8.6   MG 1.70  --   --   --           Microbiology Results (last 7 days)       Procedure Component Value Units Date/Time    Blood Culture [[id number]]  (Normal) Collected: 11/13/23 0837    Order Status: Completed Specimen: Blood from Arm, Right Updated: 11/18/23 1001     CULTURE, BLOOD ([facility]) No Growth at 5 days    Blood Culture [[id number]]  (Normal) Collected: 11/13/23 0837    Order Status: Completed Specimen: Blood from Arm, Left Updated: 11/18/23 1001     CULTURE, BLOOD ([facility]) No Growth at 5 days    Blood culture x two cultures. Draw prior to antibiotics. [[id number]]  (Normal) Collected: 11/10/23 1712    Order Status: Completed Specimen: Blood Updated: 11/15/23 2101     CULTURE, BLOOD ([facility]) No Growth at 5 days    Blood culture x two cultures. Draw prior to antibiotics. [[id number]]  (Abnormal)  (Susceptibility) Collected: 11/10/23 1712    Order Status: Completed Specimen: Blood Updated: 11/13/23 0651     CULTURE, BLOOD ([facility]) Staphylococcus hominis     GRAM STAIN Gram Positive Cocci, probable Staphylococcus      Seen in gram stain of broth only      2 of 2 bottles positive             Radiology:  Echo    Left Ventricle: The left ventricle is normal in size. Normal wall   thickness. Normal wall motion. There is normal systolic function with a   visually estimated ejection fraction of 55 - 60%. Grade I diastolic   dysfunction.     Right Ventricle: Normal right ventricular cavity size. Systolic   function is borderline low. TAPSE is 1.48 cm.    Left Atrium: Left atrium is mildly dilated.    Aortic Valve: There is mild aortic valve sclerosis. There is mild   aortic regurgitation.    Mitral Valve: Mildly thickened leaflets. There is mild regurgitation.    Tricuspid Valve: There is mild regurgitation.      Scheduled Med:   amLODIPine  5 mg Oral QHS    aspirin  81 mg Oral Daily    benzonatate  200 mg Oral TID    budesonide  0.25 mg Nebulization Daily    citalopram  40 mg Oral QAM    docusate sodium  100 mg Oral BID    enoxparin  40 mg Subcutaneous Daily    furosemide  40 mg Oral QAM    guaiFENesin  600 mg Oral BID    levothyroxine  50 mcg Oral Before breakfast    losartan  100 mg Oral QAM    metoprolol tartrate  100 mg Oral QHS    pravastatin  40 mg Oral QHS    traZODone  200 mg Oral Nightly        Continuous Infusions:       PRN Meds:  sodium chloride 0.9%, acetaminophen, albuterol-ipratropium, clonazePAM, dextromethorphan-guaiFENesin  mg/5 ml, dextrose 10%, dextrose 10%, glucagon (human recombinant), glucose, glucose, insulin aspart U-100, melatonin, polyethylene glycol, sodium chloride 0.9%       Assessment/Plan:   Community-acquired pneumonia, bilaterally   Acute hypoxemic respiratory failure   Bacteremia secondary to pneumonia   Essential hypertension  Hypokalemia  Type 2 diabetes mellitus  Hyperlipidemia  Coronary artery disease s/p PCI  Hypothyroidism    Patient was completed antibiotics and continues to make improvement.  I have weaned her down to room air this morning.  If we can remain off of supplemental O2 could potentially go home tomorrow.  Continue PT and OT.  Patient we would benefit from home health services.  Encouraged her to continue using her incentive spirometer.  Continue guaifenesin for mucus.  Vital signs are stable.  She was back on her home regimen.    Suspect she will go home in the next 24 hours.      Chriss PATTON  MD Al[place]   11/20/2023     All diagnosis and differential diagnosis have been reviewed; assessment and plan has been documented; I have personally reviewed the labs and test results that are presently available; I have reviewed the patients medication list; I have reviewed the consulting providers response and recommendations. I have reviewed or attempted to review medical records based upon their availability    All of the patient's questions have been  addressed and answered. Patient's is agreeable to the above stated plan. I will continue to monitor closely and make adjustments to medical management as needed.  _____________________________________________________________________

## 2023-11-20 NOTE — PT/OT/SLP PROGRESS
Physical Therapy Treatment    Patient Name:  Narcisa Perales   MRN:  [id number]    Recommendations:     Discharge therapy intensity: Low Intensity Therapy   Discharge Equipment Recommendations: walker, rolling  Barriers to discharge: None    Assessment:     Narcisa Perales is a 83 y.o. female admitted with a medical diagnosis of Pneumonia of both lungs due to infectious organism.  She presents with the following impairments/functional limitations: weakness, impaired endurance, impaired functional mobility, impaired balance, decreased lower extremity function.    Rehab Prognosis: Good; patient would benefit from acute skilled PT services to address these deficits and reach maximum level of function.    Recent Surgery: * No surgery found *      Plan:     During this hospitalization, patient to be seen 5 x/week to address the identified rehab impairments via gait training, therapeutic activities, therapeutic exercises, neuromuscular re-education and progress toward the following goals:    Plan of Care Expires:  12/10/23    Subjective     Chief Complaint:   Patient/Family Comments/goals:   Pain/Comfort:         Objective:     Communicated with nursing prior to session.  Patient found up in chair with pulse ox (continuous), telemetry, oxygen upon PT entry to room.     General Precautions: Standard, fall  Orthopedic Precautions:    Braces:    Respiratory Status: Nasal cannula, flow 1 L/min, O2 88%-93% during amb  Blood Pressure:   Skin Integrity: Visible skin intact      Functional Mobility:  Transfers:     Sit to Stand:  stand by assistance with rolling walker  Gait: 400' w/RW, CGA  VC for posture & to step into walker    Education:  Patient provided with verbal education education regarding safe use of AD.  Understanding was verbalized.     Patient left up in chair with all lines intact, call button in reach, and nurse notified.    GOALS:   Multidisciplinary Problems       Physical Therapy Goals          Problem:  Physical Therapy    Goal Priority Disciplines Outcome Goal Variances Interventions   Physical Therapy Goal     PT, PT/OT Ongoing, Progressing     Description: Goals to be met by: 12/10/23     Patient will increase functional independence with mobility by performin. Supine to sit with Stand-by Assistance- MET  2. Sit to supine with Stand-by Assistance- MET  3. Sit to stand transfer with Stand-by Assistance  4. Bed to chair transfer with Stand-by Assistance using Rolling Walker- MET  5. Gait  x 150 feet with Stand-by Assistance using Rolling Walker- MET  6. Pt independent with all bed mobility.  7. Pt performs functional transfers Mod I/Independent with least restrictive AD.  8. Pt ambulates Ind/Mod Ind > or = 300ft with least restrictive AD.                         Time Tracking:     PT Received On: 23  PT Start Time: 1353     PT Stop Time: 1408  PT Total Time (min): 15 min     Billable Minutes: Gait Training 15    Treatment Type: Treatment  PT/PTA: PTA     Number of PTA visits since last PT visit: 2     2023

## 2023-11-21 VITALS
RESPIRATION RATE: 18 BRPM | HEIGHT: 60 IN | WEIGHT: 170 LBS | TEMPERATURE: 99 F | DIASTOLIC BLOOD PRESSURE: 80 MMHG | BODY MASS INDEX: 33.38 KG/M2 | SYSTOLIC BLOOD PRESSURE: 134 MMHG | OXYGEN SATURATION: 96 % | HEART RATE: 67 BPM

## 2023-11-21 LAB — POCT GLUCOSE: 98 MG/DL (ref 70–110)

## 2023-11-21 PROCEDURE — 25000242 PHARM REV CODE 250 ALT 637 W/ HCPCS: Performed by: INTERNAL MEDICINE

## 2023-11-21 PROCEDURE — 94761 N-INVAS EAR/PLS OXIMETRY MLT: CPT

## 2023-11-21 PROCEDURE — 27000221 HC OXYGEN, UP TO 24 HOURS

## 2023-11-21 PROCEDURE — 25000003 PHARM REV CODE 250: Performed by: INTERNAL MEDICINE

## 2023-11-21 PROCEDURE — 25000003 PHARM REV CODE 250: Performed by: STUDENT IN AN ORGANIZED HEALTH CARE EDUCATION/TRAINING PROGRAM

## 2023-11-21 PROCEDURE — 94640 AIRWAY INHALATION TREATMENT: CPT

## 2023-11-21 PROCEDURE — 25000003 PHARM REV CODE 250: Performed by: NURSE PRACTITIONER

## 2023-11-21 PROCEDURE — 99900035 HC TECH TIME PER 15 MIN (STAT)

## 2023-11-21 RX ORDER — GUAIFENESIN 600 MG/1
600 TABLET, EXTENDED RELEASE ORAL 2 TIMES DAILY
Qty: 60 TABLET | Refills: 0 | Status: SHIPPED | OUTPATIENT
Start: 2023-11-21 | End: 2023-12-21

## 2023-11-21 RX ORDER — ALBUTEROL SULFATE 90 UG/1
2 AEROSOL, METERED RESPIRATORY (INHALATION) EVERY 6 HOURS PRN
Qty: 1 G | Refills: 0 | Status: SHIPPED | OUTPATIENT
Start: 2023-11-21 | End: 2023-12-21

## 2023-11-21 RX ORDER — BENZONATATE 200 MG/1
200 CAPSULE ORAL 3 TIMES DAILY
Qty: 30 CAPSULE | Refills: 0 | Status: SHIPPED | OUTPATIENT
Start: 2023-11-21 | End: 2023-12-01

## 2023-11-21 RX ADMIN — FUROSEMIDE 40 MG: 40 TABLET ORAL at 06:11

## 2023-11-21 RX ADMIN — BENZONATATE 200 MG: 100 CAPSULE ORAL at 09:11

## 2023-11-21 RX ADMIN — ASPIRIN 81 MG: 81 TABLET, COATED ORAL at 09:11

## 2023-11-21 RX ADMIN — LOSARTAN POTASSIUM 100 MG: 50 TABLET, FILM COATED ORAL at 06:11

## 2023-11-21 RX ADMIN — LEVOTHYROXINE SODIUM 50 MCG: 50 TABLET ORAL at 06:11

## 2023-11-21 RX ADMIN — CITALOPRAM HYDROBROMIDE 40 MG: 20 TABLET ORAL at 06:11

## 2023-11-21 RX ADMIN — DOCUSATE SODIUM 100 MG: 100 CAPSULE, LIQUID FILLED ORAL at 09:11

## 2023-11-21 RX ADMIN — GUAIFENESIN AND DEXTROMETHORPHAN 5 ML: 100; 10 SYRUP ORAL at 10:11

## 2023-11-21 RX ADMIN — BUDESONIDE INHALATION 0.25 MG: 0.5 SUSPENSION RESPIRATORY (INHALATION) at 11:11

## 2023-11-21 RX ADMIN — GUAIFENESIN 600 MG: 600 TABLET, EXTENDED RELEASE ORAL at 09:11

## 2023-11-21 NOTE — PROGRESS NOTES
[facility] Hospital Medicine Progress Note        Chief Complaint: Inpatient Follow-up     HPI:   83-year-old female with past medical history as below who has had progressive dyspnea over the last several days.  She denied fever or purulent sputum production.  She went to an urgent care today where she was found to be satting 88% on room air and was referred to the ER for further evaluation.  Here she arrived satting in the mid 90s on 2 L nasal cannula but afebrile and hemodynamically stable.  Laboratory work was unremarkable.  CT of the chest however showed left lower lobe and lingula and right middle lobe areas of infiltrate consistent with multifocal pneumonia.  Blood cultures were obtained and she was started on broad-spectrum antibiotics admitted to the hospitalist service for further management.  Was put on OxyMask and was weaned off currently on nasal cannula.  Blood cultures 2/2 positive for Staphylococcus hormone knee.  Susceptible to vancomycin.  Respiratory culture and urine cultures have been negative.      Interval Hx:  Patient remains on 1 L nasal cannula.  We attempted to take her off but then dropped to the low 80s.  Los reported 82%.  Back on 1 L now 97%.  Will ask case management to help arrange for home O2.  Patient was anxious to go home.  Coughing has improved and otherwise she was stable.     Objective/physical exam:  General: In no acute distress, afebrile  Chest: Clear to auscultation bilaterally  Heart: RRR, +S1, S2, no appreciable murmur  Abdomen: Soft, nontender, BS +  MSK: Warm, no lower extremity edema, no clubbing or cyanosis  Neurologic: Alert and oriented x4, Cranial nerve II-XII intact, Strength 5/5 in all 4 extremities    VITAL SIGNS: 24 HRS MIN & MAX LAST   Temp  Min: 97.8 °F (36.6 °C)  Max: 98.4 °F (36.9 °C) 97.8 °F (36.6 °C)   BP  Min: 111/70  Max: 136/74 133/67   Pulse  Min: 57  Max: 80  69   Resp  Min: 18  Max: 20 18   SpO2  Min: 88 %  Max: 97 %  96 %       Recent Labs   Lab 11/15/23  0453 11/16/23  0424   WBC 5.05 4.09*   RBC 4.26 4.23   HGB 12.0 12.0   HCT 36.5* 36.7*   MCV 85.7 86.8   MCH 28.2 28.4   MCHC 32.9* 32.7*   RDW 13.2 13.2    211   MPV 9.1 8.9       Recent Labs   Lab 11/15/23  0453 11/16/23  0424 11/18/23  0426    136 138   K 3.8 3.3* 3.6   CO2 35* 38* 34*   BUN 14.3 14.2 14.1   CREATININE 0.80 0.74 0.79   CALCIUM 8.4 8.3* 8.6          Microbiology Results (last 7 days)       Procedure Component Value Units Date/Time    Blood Culture [[id number]]  (Normal) Collected: 11/13/23 0837    Order Status: Completed Specimen: Blood from Arm, Right Updated: 11/18/23 1001     CULTURE, BLOOD ([facility]) No Growth at 5 days    Blood Culture [[id number]]  (Normal) Collected: 11/13/23 0837    Order Status: Completed Specimen: Blood from Arm, Left Updated: 11/18/23 1001     CULTURE, BLOOD ([facility]) No Growth at 5 days    Blood culture x two cultures. Draw prior to antibiotics. [[id number]]  (Normal) Collected: 11/10/23 1712    Order Status: Completed Specimen: Blood Updated: 11/15/23 2101     CULTURE, BLOOD ([facility]) No Growth at 5 days             Radiology:  Echo    Left Ventricle: The left ventricle is normal in size. Normal wall   thickness. Normal wall motion. There is normal systolic function with a   visually estimated ejection fraction of 55 - 60%. Grade I diastolic   dysfunction.    Right Ventricle: Normal right ventricular cavity size. Systolic   function is borderline low. TAPSE is 1.48 cm.    Left Atrium: Left atrium is mildly dilated.    Aortic Valve: There is mild aortic valve sclerosis. There is mild   aortic regurgitation.    Mitral Valve: Mildly thickened leaflets. There is mild regurgitation.    Tricuspid Valve: There is mild regurgitation.      Scheduled Med:   amLODIPine  5 mg Oral QHS    aspirin  81 mg Oral Daily    benzonatate  200 mg Oral TID    budesonide  0.25 mg Nebulization Daily    citalopram  40 mg Oral QAM    docusate sodium  100 mg  Oral BID    enoxparin  40 mg Subcutaneous Daily    furosemide  40 mg Oral QAM    guaiFENesin  600 mg Oral BID    levothyroxine  50 mcg Oral Before breakfast    losartan  100 mg Oral QAM    metoprolol tartrate  100 mg Oral QHS    pravastatin  40 mg Oral QHS    traZODone  200 mg Oral Nightly        Continuous Infusions:       PRN Meds:  sodium chloride 0.9%, acetaminophen, albuterol-ipratropium, clonazePAM, dextromethorphan-guaiFENesin  mg/5 ml, dextrose 10%, dextrose 10%, glucagon (human recombinant), glucose, glucose, insulin aspart U-100, melatonin, polyethylene glycol, sodium chloride 0.9%       Assessment/Plan:   Community-acquired pneumonia, bilaterally   Acute hypoxemic respiratory failure   Bacteremia secondary to pneumonia   Essential hypertension  Hypokalemia  Type 2 diabetes mellitus  Hyperlipidemia  Coronary artery disease s/p PCI  Hypothyroidism    Case management consult for home oxygen.    Continue PT and OT.  Encouraged her to continue using incentive spirometer.    On guaifenesin for mucus management.    She was completed all antibiotics.    If we can get her some home oxygen can likely go home later today or tomorrow.      Chriss Canales MD   11/21/2023     All diagnosis and differential diagnosis have been reviewed; assessment and plan has been documented; I have personally reviewed the labs and test results that are presently available; I have reviewed the patients medication list; I have reviewed the consulting providers response and recommendations. I have reviewed or attempted to review medical records based upon their availability    All of the patient's questions have been  addressed and answered. Patient's is agreeable to the above stated plan. I will continue to monitor closely and make adjustments to medical management as needed.  _____________________________________________________________________

## 2023-11-21 NOTE — DISCHARGE SUMMARY
[facility] Hospital Medicine Discharge Summary    Admit Date: 11/10/2023  Discharge Date and Time: 11/21/20239:49 AM  Admitting Physician: Hospitalist team   Discharging Physician: Chriss Canales MD.  Primary Care Physician: Unique Yancey FNP      Discharge Diagnoses:  Community-acquired pneumonia, bilaterally   Acute hypoxemic respiratory failure   Bacteremia secondary to pneumonia   Essential hypertension  Hypokalemia  Type 2 diabetes mellitus  Hyperlipidemia  Coronary artery disease s/p PCI  Hypothyroidism    Hospital Course:   83-year-old female with past medical history as below who has had progressive dyspnea over the last several days.  She denied fever or purulent sputum production.  She went to an urgent care today where she was found to be satting 88% on room air and was referred to the ER for further evaluation.  Here she arrived satting in the mid 90s on 2 L nasal cannula but afebrile and hemodynamically stable.  Laboratory work was unremarkable.  CT of the chest however showed left lower lobe and lingula and right middle lobe areas of infiltrate consistent with multifocal pneumonia.  Blood cultures were obtained and she was started on broad-spectrum antibiotics admitted to the hospitalist service for further management.  Was put on OxyMask and was weaned off currently on nasal cannula.  Blood cultures 2/2 positive for Staphylococcus hormone knee.  Susceptible to vancomycin.  Respiratory culture and urine cultures have been negative.  Patient completed antibiotic course in the hospital.  Her oxygenation did improve but she was still requiring 1 L nasal cannula to maintain oxygen saturation greater than 88%.  Attempts to wean off have been unsuccessful.  Overall she was feeling better though and anxious to go home.  We have set her up with some home oxygen.  Will also send him with a albuterol inhaler, Tessalon Perles, and guaifenesin.  Will need close follow up with  her PCP.  Can repeat her chest x-ray in 3-4 weeks.  Home health has also been arranged    Vitals:  Blood pressure 135/69, pulse 60, temperature 98 °F (36.7 °C), temperature source Oral, resp. rate 18, height 5' (1.524 m), weight 77.1 kg (169 lb 15.6 oz), SpO2 95 %..    Physical Exam:  Awake, Alert, Oriented x 3, No new focal Neurologic deficit, Normal Affect  NC/AT, PERRLA, EOMI  Supple neck, no JVD, No cervical lymphadenopathy  Symmetrical chest, Good air entry bilaterally. No rhonchi, wheezes, crackles appreciated  RRR, No gallop, rub or murmur  +ve Bowel sounds x4, Abd soft and non tender, no rebound, guarding or rigidity  No Cyanosis, cludding or edema, No new rash or bruises    Procedures Performed: No admission procedures for hospital encounter.     Significant Diagnostic Studies: See Full reports for all details  No results displayed because visit has over 200 results.           Microbiology Results (last 7 days)       Procedure Component Value Units Date/Time    Blood Culture [[id number]]  (Normal) Collected: 11/13/23 0837    Order Status: Completed Specimen: Blood from Arm, Right Updated: 11/18/23 1001     CULTURE, BLOOD ([facility]) No Growth at 5 days    Blood Culture [[id number]]  (Normal) Collected: 11/13/23 0837    Order Status: Completed Specimen: Blood from Arm, Left Updated: 11/18/23 1001     CULTURE, BLOOD ([facility]) No Growth at 5 days    Blood culture x two cultures. Draw prior to antibiotics. [[id number]]  (Normal) Collected: 11/10/23 1712    Order Status: Completed Specimen: Blood Updated: 11/15/23 2101     CULTURE, BLOOD ([facility]) No Growth at 5 days             Echo    Result Date: 11/13/2023    Left Ventricle: The left ventricle is normal in size. Normal wall thickness. Normal wall motion. There is normal systolic function with a visually estimated ejection fraction of 55 - 60%. Grade I diastolic dysfunction.   Right Ventricle: Normal right ventricular cavity size. Systolic function is borderline low. TAPSE  is 1.48 cm.   Left Atrium: Left atrium is mildly dilated.   Aortic Valve: There is mild aortic valve sclerosis. There is mild aortic regurgitation.   Mitral Valve: Mildly thickened leaflets. There is mild regurgitation.   Tricuspid Valve: There is mild regurgitation.     CT Chest With Contrast    Result Date: 11/10/2023  EXAMINATION: CT CHEST WITH CONTRAST CLINICAL HISTORY: Cough, persistent;Sepsis; TECHNIQUE: Low dose axial images, sagittal and coronal reformations were obtained from the thoracic inlet to the lung bases. Contrast was  administered.  Automatic exposure control is utilized to reduce patient radiation exposure. COMPARISON: None. FINDINGS: The lungs are adequately aerated.  No infiltrate is seen.  No mass is seen.  No lesion is seen.  No pleural effusion is seen.  No pleural thickening is seen.  The mediastinum appears grossly unremarkable.  No abnormal lymphadenopathy is seen.  No enhancing lesion is seen.  Thoracic aorta appears grossly unremarkable.  Heart appears normal. Visualized portions of the upper abdomen shows a cyst in the right kidney.     Mild patchy infiltrate in the left lower lobe posterior basal aspect Areas of atelectasis versus developing infiltrate in the lingula and right middle lobe Follow-up is recommended Electronically signed by: Cristopher Sevilla Date:    11/10/2023 Time:    19:53    X-Ray Chest AP Portable    Result Date: 11/10/2023  EXAMINATION XR CHEST AP PORTABLE CLINICAL HISTORY Sepsis; TECHNIQUE A total of 1 frontal image(s) of the chest. COMPARISON 23 November 2020 FINDINGS Lines/tubes/devices: ECG leads overlie the imaged region.  Spinal cord stimulator leads are also again appreciated, similar positioning. The cardiomediastinal silhouette and central pulmonary vasculature are unremarkable for utilized technique.  The trachea is midline.  There is similar curvilinear atelectasis versus scarring at the left midlung region.  There is no lobar consolidation, pleural  effusion, or pneumothorax. There is no acute osseous or extrathoracic abnormality. IMPRESSION No convincing acute radiographic abnormality. Electronically signed by: Eris Calderón Date:    11/10/2023 Time:    19:11    X-Ray Chest PA And Lateral    Result Date: 11/10/2023  Chest: Left lingula infiltrate. The costophrenic angles are sharp, without evidence for effusion. Heart size and mediastinal contours are within normal limits. There is no osseous abnormality identified.    Left lingula infiltrate. Electronically signed by: Tommy Patel M.D. on 11/10/2023 03:30:49 PM /[facility] last radiology orders        Medication List        START taking these medications      albuterol 90 mcg/actuation inhaler  Commonly known as: VENTOLIN HFA  Inhale 2 puffs into the lungs every 6 (six) hours as needed for Wheezing. Rescue     benzonatate 200 MG capsule  Commonly known as: TESSALON  Take 1 capsule (200 mg total) by mouth 3 (three) times daily. for 10 days     guaiFENesin 600 mg 12 hr tablet  Commonly known as: MUCINEX  Take 1 tablet (600 mg total) by mouth 2 (two) times daily.            CONTINUE taking these medications      amLODIPine 5 MG tablet  Commonly known as: NORVASC     aspirin 81 MG EC tablet  Commonly known as: ECOTRIN     citalopram 40 MG tablet  Commonly known as: CeleXA     clonazePAM 1 MG tablet  Commonly known as: KlonoPIN     famotidine 40 MG tablet  Commonly known as: PEPCID     furosemide 40 MG tablet  Commonly known as: LASIX     glipiZIDE 5 MG Tr24     ketoconazole 2 % shampoo  Commonly known as: NIZORAL     levothyroxine 50 MCG tablet  Commonly known as: SYNTHROID     losartan 100 MG tablet  Commonly known as: COZAAR     metoprolol tartrate 100 MG tablet  Commonly known as: LOPRESSOR     pravastatin 40 MG tablet  Commonly known as: PRAVACHOL     traZODone 100 MG tablet  Commonly known as: DESYREL               Where to Get Your Medications        These medications were sent to [facility]  - [place] - [address], [place] [address]      Phone: [phone]   albuterol 90 mcg/actuation inhaler  benzonatate 200 MG capsule  guaiFENesin 600 mg 12 hr tablet          Explained in detail to the patient about the discharge plan, medications, and follow-up visits. Pt understands and agrees with the treatment plan  Discharged Condition: stable  Diet: regular  Disposition: home with home health    Medications Per [place] med rec  Activities as tolerated  Follow up with your PCP in 2 wks   For further questions contact hospitalist office    Discharge time 33 minutes    For worsening symptoms, chest pain, shortness of breath, increased abdominal pain, high grade fever, stroke or stroke like symptoms, immediately go to the nearest Emergency Room or call 911 as soon as possible.        Chriss Brasher M.D, on 11/21/2023. at 9:49 AM.

## 2023-11-21 NOTE — PLAN OF CARE
11/21/23 0918   Discharge Reassessment   Assessment Type Discharge Planning Reassessment   Did the patient's condition or plan change since previous assessment? Yes   Discharge Plan discussed with: Patient   Discharge Plan A Home Health;Home with family   Discharge Plan B Home with family;Home Health   DME Needed Upon Discharge  oxygen;walker, rolling  (Rolling walker set up)   Transition of Care Barriers None   Why the patient remains in the hospital Requires continued medical care   Post-Acute Status   Post-Acute Authorization Home Health  (home O2)   Home Health Status Set-up Complete/Auth obtained   Patient choice form signed by patient/caregiver List with quality metrics by geographic area provided   Discharge Delays None known at this time     Gave patient choice for home O2. Sent referral for home O2 to [facility].

## 2023-11-21 NOTE — PLAN OF CARE
11/21/23 1019   Final Note   Assessment Type Final Discharge Note   Anticipated Discharge Disposition Home-Health   Post-Acute Status   Post-Acute Authorization Home Health   Home Health Status Set-up Complete/Auth obtained   Discharge Delays None known at this time     D/C order, AVS, and D/C summary sent to [facility] for update.

## 2023-11-21 NOTE — PT/OT/SLP PROGRESS
Attempted OT session this AM however pt politely declining to participate 2/2 awaiting d/c. Home O2 was delivered to room and pt requesting to call her son to set up transport. Will f/u as appropriate.

## 2023-11-21 NOTE — DISCHARGE INSTRUCTIONS
PURCHASE PULSE OXIMETER      KEEP OXYGEN ABOVE 92% - Normal oxygen saturation is 95%-100%       Completed Antibiotics at Hospital - Patient got Vancomycin and zosyn.

## 2023-11-22 ENCOUNTER — PATIENT OUTREACH (OUTPATIENT)
Dept: ADMINISTRATIVE | Facility: CLINIC | Age: 83
End: 2023-11-22
Payer: MEDICARE

## 2023-11-22 NOTE — PROGRESS NOTES
C3 nurse attempted to contact Narcisa Perales  for a TCC post hospital discharge follow up call. The patient is currently with  and unable to conduct the call @ this time. The patient requested a callback.    The patient has a scheduled [facility] appointment with Unique Yancey FNP (Family Medicine) on 11/28/2023 at 1:45 pm

## 2023-11-27 NOTE — PROGRESS NOTES
C3 nurse (returned call) made third attempt to contact Narcisa Perales for a TCC post hospital discharge follow up call.  No answer, left VM.

## 2023-11-27 NOTE — PROGRESS NOTES
C3 nurse made second attempt to contact Narcisa Perales for a TCC post hospital discharge follow up call.  Patient unable to conduct call at this time and requesting a call back

## 2024-02-12 PROBLEM — J18.9 PNEUMONIA OF BOTH LUNGS DUE TO INFECTIOUS ORGANISM: Status: RESOLVED | Noted: 2023-11-10 | Resolved: 2024-02-12
